# Patient Record
Sex: FEMALE | Race: WHITE | Employment: FULL TIME | URBAN - METROPOLITAN AREA
[De-identification: names, ages, dates, MRNs, and addresses within clinical notes are randomized per-mention and may not be internally consistent; named-entity substitution may affect disease eponyms.]

---

## 2017-02-28 ENCOUNTER — OFFICE VISIT (OUTPATIENT)
Dept: FAMILY MEDICINE | Facility: CLINIC | Age: 52
End: 2017-02-28
Payer: COMMERCIAL

## 2017-02-28 VITALS
HEIGHT: 68 IN | TEMPERATURE: 98.6 F | RESPIRATION RATE: 12 BRPM | OXYGEN SATURATION: 100 % | BODY MASS INDEX: 20.28 KG/M2 | DIASTOLIC BLOOD PRESSURE: 70 MMHG | HEART RATE: 94 BPM | SYSTOLIC BLOOD PRESSURE: 104 MMHG | WEIGHT: 133.8 LBS

## 2017-02-28 DIAGNOSIS — N95.1 MENOPAUSAL SYMPTOMS: ICD-10-CM

## 2017-02-28 DIAGNOSIS — Z13.1 SCREENING FOR DIABETES MELLITUS: ICD-10-CM

## 2017-02-28 DIAGNOSIS — F32.5 MAJOR DEPRESSION IN COMPLETE REMISSION (H): ICD-10-CM

## 2017-02-28 DIAGNOSIS — Z00.00 ENCOUNTER FOR ROUTINE ADULT HEALTH EXAMINATION WITHOUT ABNORMAL FINDINGS: Primary | ICD-10-CM

## 2017-02-28 DIAGNOSIS — Z23 NEED FOR PROPHYLACTIC VACCINATION WITH TETANUS-DIPHTHERIA (TD): ICD-10-CM

## 2017-02-28 DIAGNOSIS — Z11.59 NEED FOR HEPATITIS C SCREENING TEST: ICD-10-CM

## 2017-02-28 DIAGNOSIS — Z13.220 SCREENING FOR HYPERLIPIDEMIA: ICD-10-CM

## 2017-02-28 DIAGNOSIS — Z23 NEED FOR PROPHYLACTIC VACCINATION WITH COMBINED DIPHTHERIA-TETANUS-PERTUSSIS (DTP) VACCINE: ICD-10-CM

## 2017-02-28 LAB
CHOLEST SERPL-MCNC: 219 MG/DL
GLUCOSE SERPL-MCNC: 105 MG/DL (ref 70–99)
HDLC SERPL-MCNC: 87 MG/DL
LDLC SERPL CALC-MCNC: 113 MG/DL
NONHDLC SERPL-MCNC: 132 MG/DL
TRIGL SERPL-MCNC: 96 MG/DL

## 2017-02-28 PROCEDURE — 80061 LIPID PANEL: CPT | Performed by: PHYSICIAN ASSISTANT

## 2017-02-28 PROCEDURE — 90715 TDAP VACCINE 7 YRS/> IM: CPT | Performed by: PHYSICIAN ASSISTANT

## 2017-02-28 PROCEDURE — 82947 ASSAY GLUCOSE BLOOD QUANT: CPT | Performed by: PHYSICIAN ASSISTANT

## 2017-02-28 PROCEDURE — 99396 PREV VISIT EST AGE 40-64: CPT | Mod: 25 | Performed by: PHYSICIAN ASSISTANT

## 2017-02-28 PROCEDURE — 90471 IMMUNIZATION ADMIN: CPT | Performed by: PHYSICIAN ASSISTANT

## 2017-02-28 PROCEDURE — 36415 COLL VENOUS BLD VENIPUNCTURE: CPT | Performed by: PHYSICIAN ASSISTANT

## 2017-02-28 PROCEDURE — 86803 HEPATITIS C AB TEST: CPT | Performed by: PHYSICIAN ASSISTANT

## 2017-02-28 RX ORDER — OXYCODONE AND ACETAMINOPHEN 5; 325 MG/1; MG/1
TABLET ORAL
Refills: 0 | COMMUNITY
Start: 2017-02-11 | End: 2018-06-11

## 2017-02-28 RX ORDER — VENLAFAXINE HYDROCHLORIDE 150 MG/1
150 CAPSULE, EXTENDED RELEASE ORAL DAILY
Qty: 90 CAPSULE | Refills: 3 | Status: SHIPPED | OUTPATIENT
Start: 2017-02-28 | End: 2018-05-17

## 2017-02-28 ASSESSMENT — ANXIETY QUESTIONNAIRES
6. BECOMING EASILY ANNOYED OR IRRITABLE: NOT AT ALL
IF YOU CHECKED OFF ANY PROBLEMS ON THIS QUESTIONNAIRE, HOW DIFFICULT HAVE THESE PROBLEMS MADE IT FOR YOU TO DO YOUR WORK, TAKE CARE OF THINGS AT HOME, OR GET ALONG WITH OTHER PEOPLE: NOT DIFFICULT AT ALL
3. WORRYING TOO MUCH ABOUT DIFFERENT THINGS: NOT AT ALL
2. NOT BEING ABLE TO STOP OR CONTROL WORRYING: NOT AT ALL
7. FEELING AFRAID AS IF SOMETHING AWFUL MIGHT HAPPEN: NOT AT ALL
5. BEING SO RESTLESS THAT IT IS HARD TO SIT STILL: NOT AT ALL
1. FEELING NERVOUS, ANXIOUS, OR ON EDGE: NOT AT ALL
GAD7 TOTAL SCORE: 0

## 2017-02-28 ASSESSMENT — PATIENT HEALTH QUESTIONNAIRE - PHQ9: 5. POOR APPETITE OR OVEREATING: NOT AT ALL

## 2017-02-28 NOTE — PATIENT INSTRUCTIONS
Try filling prescription for effexor 150 mg daily  Contact us if this is not covered and will prescribe citalopram  We will notify you of lab results by Aiotragordon     Preventive Health Recommendations  Female Ages 50 - 64    Yearly exam: See your health care provider every year in order to  o Review health changes.   o Discuss preventive care.    o Review your medicines if your doctor has prescribed any.      Get a Pap test every three years (unless you have an abnormal result and your provider advises testing more often).    If you get Pap tests with HPV test, you only need to test every 5 years, unless you have an abnormal result.     You do not need a Pap test if your uterus was removed (hysterectomy) and you have not had cancer.    You should be tested each year for STDs (sexually transmitted diseases) if you're at risk.     Have a mammogram every 1 to 2 years.    Have a colonoscopy at age 50, or have a yearly FIT test (stool test). These exams screen for colon cancer.      Have a cholesterol test every 5 years, or more often if advised.    Have a diabetes test (fasting glucose) every three years. If you are at risk for diabetes, you should have this test more often.     If you are at risk for osteoporosis (brittle bone disease), think about having a bone density scan (DEXA).    Shots: Get a flu shot each year. Get a tetanus shot every 10 years.    Nutrition:     Eat at least 5 servings of fruits and vegetables each day.    Eat whole-grain bread, whole-wheat pasta and brown rice instead of white grains and rice.    Talk to your provider about Calcium and Vitamin D.     Lifestyle    Exercise at least 150 minutes a week (30 minutes a day, 5 days a week). This will help you control your weight and prevent disease.    Limit alcohol to one drink per day.    No smoking.     Wear sunscreen to prevent skin cancer.     See your dentist every six months for an exam and cleaning.    See your eye doctor every 1 to 2  years.

## 2017-02-28 NOTE — NURSING NOTE
Screening Questionnaire for Adult Immunization    Are you sick today?   No   Do you have allergies to medications, food, a vaccine component or latex?   No   Have you ever had a serious reaction after receiving a vaccination?   No   Do you have a long-term health problem with heart disease, lung disease, asthma, kidney disease, metabolic disease (e.g. diabetes), anemia, or other blood disorder?   No   Do you have cancer, leukemia, HIV/AIDS, or any other immune system problem?   No   In the past 3 months, have you taken medications that affect  your immune system, such as prednisone, other steroids, or anticancer drugs; drugs for the treatment of rheumatoid arthritis, Crohn s disease, or psoriasis; or have you had radiation treatments?   No   Have you had a seizure, or a brain or other nervous system problem?   No   During the past year, have you received a transfusion of blood or blood     products, or been given immune (gamma) globulin or antiviral drug?   No   For women: Are you pregnant or is there a chance you could become        pregnant during the next month?   No   Have you received any vaccinations in the past 4 weeks?   No     Immunization questionnaire answers were all negative.      MNVFC doesn't apply on this patient    Per orders of Hortensia Ricks, injection of TDAP given by Nuria Wynne. Patient instructed to remain in clinic for 20 minutes afterwards, and to report any adverse reaction to me immediately.       Screening performed by Nuria Wynne on 2/28/2017 at 11:37 AM.

## 2017-02-28 NOTE — NURSING NOTE
"Chief Complaint   Patient presents with     Physical     fasting       Initial /70  Pulse 94  Temp 98.6  F (37  C) (Oral)  Resp 12  Ht 1.715 m (5' 7.5\")  Wt 60.7 kg (133 lb 12.8 oz)  SpO2 100%  BMI 20.65 kg/m2 Estimated body mass index is 20.65 kg/(m^2) as calculated from the following:    Height as of this encounter: 1.715 m (5' 7.5\").    Weight as of this encounter: 60.7 kg (133 lb 12.8 oz).  Medication Reconciliation: narinder Wynne        "

## 2017-02-28 NOTE — PROGRESS NOTES
SUBJECTIVE:     CC: Palmira Hunt is an 51 year old woman who presents for preventive health visit.     Healthy Habits:    Do you get at least three servings of calcium containing foods daily (dairy, green leafy vegetables, etc.)? yes    Amount of exercise or daily activities, outside of work: 5 day(s) per week    Problems taking medications regularly No    Medication side effects: No    Have you had an eye exam in the past two years? yes    Do you see a dentist twice per year? yes    Do you have sleep apnea, excessive snoring or daytime drowsiness?no            Today's PHQ-2 Score:   PHQ-2 ( 1999 Pfizer) 2/28/2017 7/26/2016   Q1: Little interest or pleasure in doing things 0 0   Q2: Feeling down, depressed or hopeless 0 0   PHQ-2 Score 0 0       Abuse: Current or Past(Physical, Sexual or Emotional)- No  Do you feel safe in your environment - Yes    Social History   Substance Use Topics     Smoking status: Never Smoker     Smokeless tobacco: Never Used     Alcohol use Yes      Comment: occasional, 3 drinks a week     The patient does not drink >3 drinks per day nor >7 drinks per week.    Recent Labs   Lab Test  11/13/15   0912  04/10/14   0839   CHOL  214*  208*   HDL  106  106   LDL  90  89   TRIG  89  65   CHOLHDLRATIO  2.0  2.0       Reviewed orders with patient.  Reviewed health maintenance and updated orders accordingly - Yes    Mammo Decision Support:  Patient over age 50, mutual decision to screen reflected in health maintenance.    Pertinent mammograms are reviewed under the imaging tab.  History of abnormal Pap smear: NO - age 30- 65 PAP every 3 years recommended    Reviewed and updated as needed this visit by clinical staff  Tobacco  Allergies  Meds  Med Hx  Surg Hx  Fam Hx  Soc Hx        Reviewed and updated as needed this visit by Provider        Not depressed.  Not taking effexor for 2 months.   No hot flashes.  Mood ok.     2 oral surgeries with infection after root canal.  On iv  "antibiotics another 2 weeks.  9 day stay in hospital    ROS:  C: NEGATIVE for fever, chills, change in weight  I: NEGATIVE for worrisome rashes, moles or lesions  E: NEGATIVE for vision changes or irritation  ENT: NEGATIVE for ear, mouth and throat problems  R: NEGATIVE for significant cough or SOB  B: NEGATIVE for masses, tenderness or discharge  CV: NEGATIVE for chest pain, palpitations or peripheral edema  GI: NEGATIVE for nausea, abdominal pain, heartburn, or change in bowel habits  : NEGATIVE for unusual urinary or vaginal symptoms. No vaginal bleeding.  M: NEGATIVE for significant arthralgias or myalgia  N: NEGATIVE for weakness, dizziness or paresthesias  P: NEGATIVE for changes in mood or affect     Problem list, Medication list, Allergies, and Medical/Social/Surgical histories reviewed in EPIC and updated as appropriate.  OBJECTIVE:     /70  Pulse 94  Temp 98.6  F (37  C) (Oral)  Resp 12  Ht 1.715 m (5' 7.5\")  Wt 60.7 kg (133 lb 12.8 oz)  SpO2 100%  BMI 20.65 kg/m2  EXAM:  GENERAL: healthy, alert and no distress  EYES: Eyes grossly normal to inspection, PERRL and conjunctivae and sclerae normal  HENT: ear canals and TM's normal, nose and mouth without ulcers or lesions  NECK: no adenopathy, no asymmetry, masses, or scars and thyroid normal to palpation  RESP: lungs clear to auscultation - no rales, rhonchi or wheezes  BREAST: normal without masses, tenderness or nipple discharge and no palpable axillary masses or adenopathy  CV: regular rate and rhythm, normal S1 S2, no S3 or S4, no murmur, click or rub, no peripheral edema and peripheral pulses strong  ABDOMEN: soft, nontender, no hepatosplenomegaly, no masses and bowel sounds normal   (female): normal female external genitalia, normal urethral meatus , vaginal mucosal atrophy and normal cervix, adnexae, and uterus without masses.  MS: no gross musculoskeletal defects noted, no edema  SKIN: no suspicious lesions or rashes  NEURO: Normal strength " "and tone, mentation intact and speech normal  PSYCH: mentation appears normal, affect normal/bright    ASSESSMENT/PLAN:     1. Encounter for routine adult health examination without abnormal findings      2. Need for hepatitis C screening test    - Hepatitis C Screen Reflex to HCV RNA Quant and Genotype    3. Need for prophylactic vaccination with tetanus-diphtheria (TD)  Given tdap    4. Screening for hyperlipidemia    - Lipid panel reflex to direct LDL    5. Screening for diabetes mellitus    - Glucose    6. Menopausal symptoms  Well managed without med.     7. Major depression in complete remission (H)    - venlafaxine (EFFEXOR-XR) 150 MG 24 hr capsule; Take 1 capsule (150 mg) by mouth daily  Dispense: 90 capsule; Refill: 3    8. Need for prophylactic vaccination with combined diphtheria-tetanus-pertussis (DTP) vaccine    - TDAP (ADACEL AGES 11-64)    COUNSELING:   Reviewed preventive health counseling, as reflected in patient instructions       Regular exercise       Healthy diet/nutrition       Vision screening       Osteoporosis Prevention/Bone Health         reports that she has never smoked. She has never used smokeless tobacco.    Estimated body mass index is 20.65 kg/(m^2) as calculated from the following:    Height as of this encounter: 1.715 m (5' 7.5\").    Weight as of this encounter: 60.7 kg (133 lb 12.8 oz).       Counseling Resources:  ATP IV Guidelines  Pooled Cohorts Equation Calculator  Breast Cancer Risk Calculator  FRAX Risk Assessment  ICSI Preventive Guidelines  Dietary Guidelines for Americans, 2010  USDA's MyPlate  ASA Prophylaxis  Lung CA Screening    Hortensia Ricks PA-C  [facility]  "

## 2017-03-01 LAB — HCV AB SERPL QL IA: NORMAL

## 2017-03-01 ASSESSMENT — ANXIETY QUESTIONNAIRES: GAD7 TOTAL SCORE: 0

## 2017-03-01 ASSESSMENT — PATIENT HEALTH QUESTIONNAIRE - PHQ9: SUM OF ALL RESPONSES TO PHQ QUESTIONS 1-9: 0

## 2017-03-01 NOTE — PROGRESS NOTES
"Raheem Palmira  Your blood sugar is borderline elevated.    This is in the \"prediabetes\" range.    Exercise and limiting carbohydrates and sugars in the diet can be helpful to avoid progression to diabetes.  At minimum we need to check your blood sugar annually.    Your cholesterol was just a little high but not high enough to warrant medications.   Your hepatitis C test was negative.   Please call or MyChart my office with any questions or concerns.    Hortensia Ricks, PAC        "

## 2017-06-02 ENCOUNTER — TELEPHONE (OUTPATIENT)
Dept: FAMILY MEDICINE | Facility: CLINIC | Age: 52
End: 2017-06-02

## 2017-06-02 ENCOUNTER — MYC MEDICAL ADVICE (OUTPATIENT)
Dept: FAMILY MEDICINE | Facility: CLINIC | Age: 52
End: 2017-06-02

## 2017-06-02 DIAGNOSIS — T75.3XXA MOTION SICKNESS, INITIAL ENCOUNTER: ICD-10-CM

## 2017-06-02 RX ORDER — SCOLOPAMINE TRANSDERMAL SYSTEM 1 MG/1
PATCH, EXTENDED RELEASE TRANSDERMAL
Qty: 7 PATCH | Refills: 0 | Status: SHIPPED | OUTPATIENT
Start: 2017-06-02 | End: 2018-06-11

## 2017-06-02 NOTE — TELEPHONE ENCOUNTER
Reason for Call:  Medication or medication refill:    Do you use a [facility]?  Name of the pharmacy and phone number for the current request:  [facility] [address] [place] [address] [address]    Name of the medication requested: asking for medication to take on cruise this Sunday the 4th. Please call when taken care of    Other request:     Can we leave a detailed message on this number? YES    Phone number patient can be reached at: Cell number on file:    Telephone Information:   Mobile [phone]       Best Time: any    Call taken on 6/2/2017 at 2:23 PM by Berenice Quintana

## 2017-06-02 NOTE — TELEPHONE ENCOUNTER
Reason for Call:  Medication or medication refill:    Do you use a [facility]?  Name of the pharmacy and phone number for the current request:  [facility]    Name of the medication requested: patches for sea sickness needs today plz call when approved    Other request: please call when approved    Can we leave a detailed message on this number? YES    Phone number patient can be reached at: Cell number on file:    Telephone Information:   Mobile [phone]       Best Time: any    Call taken on 6/2/2017 at 9:00 AM by Anila Anne

## 2018-05-17 ENCOUNTER — TELEPHONE (OUTPATIENT)
Dept: FAMILY MEDICINE | Facility: CLINIC | Age: 53
End: 2018-05-17

## 2018-05-17 DIAGNOSIS — F32.5 MAJOR DEPRESSION IN COMPLETE REMISSION (H): ICD-10-CM

## 2018-05-17 RX ORDER — VENLAFAXINE HYDROCHLORIDE 150 MG/1
150 CAPSULE, EXTENDED RELEASE ORAL DAILY
Qty: 30 CAPSULE | Refills: 0 | Status: SHIPPED | OUTPATIENT
Start: 2018-05-17 | End: 2018-06-11

## 2018-05-17 NOTE — TELEPHONE ENCOUNTER
Medication is being filled for 1 time refill only due to:  Patient needs labs serum creatinine, BP check, and PHQ-9. Patient needs to be seen because it has been more than one year since last visit.     Team, please inform patient a 30 day geena refill has been sent but she needs to be seen before any further refills.     Teresa Cortes RN

## 2018-05-17 NOTE — TELEPHONE ENCOUNTER
"Requested Prescriptions   Pending Prescriptions Disp Refills     venlafaxine (EFFEXOR-XR) 150 MG 24 hr capsule 90 capsule 3     Sig: Take 1 capsule (150 mg) by mouth daily    Serotonin-Norepinephrine Reuptake Inhibitors  Failed    5/17/2018  8:22 AM       Failed - Blood pressure under 140/90 in past 12 months    BP Readings from Last 3 Encounters:   02/28/17 104/70   12/27/16 120/88   07/26/16 126/88                Failed - PHQ-9 score of less than 5 in past 6 months    Please review last PHQ-9 score.          Failed - Normal serum creatinine on file in past 12 months    Recent Labs   Lab Test  06/15/11   1137   CR  0.78            Failed - Recent (6 mo) or future (30 days) visit within the authorizing provider's specialty    Patient had office visit in the last 6 months or has a visit in the next 30 days with authorizing provider or within the authorizing provider's specialty.  See \"Patient Info\" tab in inbasket, or \"Choose Columns\" in Meds & Orders section of the refill encounter.           Passed - Patient is age 18 or older       Passed - No active pregnancy on record       Passed - No positive pregnancy test in past 12 months        venlafaxine (EFFEXOR-XR) 150 MG 24 hr capsule  Last Written Prescription Date:  2/28/17  Last Fill Quantity: 90,  # refills: 3   Last office visit: 2/28/2017 with prescribing provider:  Hortensia Ricks   Future Office Visit:      "

## 2018-06-01 ENCOUNTER — DOCUMENTATION ONLY (OUTPATIENT)
Dept: LAB | Facility: CLINIC | Age: 53
End: 2018-06-01

## 2018-06-01 DIAGNOSIS — E78.2 MIXED HYPERLIPIDEMIA: Primary | ICD-10-CM

## 2018-06-01 DIAGNOSIS — Z13.1 SCREENING FOR DIABETES MELLITUS: ICD-10-CM

## 2018-06-01 NOTE — PROGRESS NOTES
Please place or confirm lab orders for upcoming lab appointment on 06/08/2018, Physician appointment on 06/11/2018. DALE Dumas CMA.

## 2018-06-06 DIAGNOSIS — E78.2 MIXED HYPERLIPIDEMIA: ICD-10-CM

## 2018-06-06 DIAGNOSIS — Z13.1 SCREENING FOR DIABETES MELLITUS: ICD-10-CM

## 2018-06-06 LAB
CHOLEST SERPL-MCNC: 280 MG/DL
GLUCOSE SERPL-MCNC: 86 MG/DL (ref 70–99)
HDLC SERPL-MCNC: 134 MG/DL
LDLC SERPL CALC-MCNC: 137 MG/DL
NONHDLC SERPL-MCNC: 146 MG/DL
TRIGL SERPL-MCNC: 44 MG/DL

## 2018-06-06 PROCEDURE — 80061 LIPID PANEL: CPT | Performed by: NURSE PRACTITIONER

## 2018-06-06 PROCEDURE — 82947 ASSAY GLUCOSE BLOOD QUANT: CPT | Performed by: NURSE PRACTITIONER

## 2018-06-06 PROCEDURE — 36415 COLL VENOUS BLD VENIPUNCTURE: CPT | Performed by: NURSE PRACTITIONER

## 2018-06-11 ENCOUNTER — OFFICE VISIT (OUTPATIENT)
Dept: FAMILY MEDICINE | Facility: CLINIC | Age: 53
End: 2018-06-11
Payer: COMMERCIAL

## 2018-06-11 ENCOUNTER — RESULT FOLLOW UP (OUTPATIENT)
Dept: FAMILY MEDICINE | Facility: CLINIC | Age: 53
End: 2018-06-11

## 2018-06-11 VITALS
BODY MASS INDEX: 20.57 KG/M2 | DIASTOLIC BLOOD PRESSURE: 82 MMHG | WEIGHT: 135.7 LBS | HEIGHT: 68 IN | OXYGEN SATURATION: 98 % | SYSTOLIC BLOOD PRESSURE: 102 MMHG | HEART RATE: 86 BPM | TEMPERATURE: 98.5 F | RESPIRATION RATE: 18 BRPM

## 2018-06-11 DIAGNOSIS — R87.612 PAPANICOLAOU SMEAR OF CERVIX WITH LOW GRADE SQUAMOUS INTRAEPITHELIAL LESION (LGSIL): ICD-10-CM

## 2018-06-11 DIAGNOSIS — Z00.00 ENCOUNTER FOR ROUTINE ADULT HEALTH EXAMINATION WITHOUT ABNORMAL FINDINGS: Primary | ICD-10-CM

## 2018-06-11 DIAGNOSIS — Z12.4 SCREENING FOR MALIGNANT NEOPLASM OF CERVIX: ICD-10-CM

## 2018-06-11 DIAGNOSIS — F32.5 MAJOR DEPRESSION IN COMPLETE REMISSION (H): ICD-10-CM

## 2018-06-11 DIAGNOSIS — Z12.31 VISIT FOR SCREENING MAMMOGRAM: ICD-10-CM

## 2018-06-11 PROCEDURE — 87624 HPV HI-RISK TYP POOLED RSLT: CPT | Performed by: NURSE PRACTITIONER

## 2018-06-11 PROCEDURE — G0145 SCR C/V CYTO,THINLAYER,RESCR: HCPCS | Performed by: NURSE PRACTITIONER

## 2018-06-11 PROCEDURE — 99396 PREV VISIT EST AGE 40-64: CPT | Performed by: NURSE PRACTITIONER

## 2018-06-11 RX ORDER — VENLAFAXINE HYDROCHLORIDE 150 MG/1
150 CAPSULE, EXTENDED RELEASE ORAL DAILY
Qty: 90 CAPSULE | Refills: 3 | Status: SHIPPED | OUTPATIENT
Start: 2018-06-11 | End: 2019-06-20

## 2018-06-11 ASSESSMENT — ANXIETY QUESTIONNAIRES
5. BEING SO RESTLESS THAT IT IS HARD TO SIT STILL: NOT AT ALL
GAD7 TOTAL SCORE: 0
1. FEELING NERVOUS, ANXIOUS, OR ON EDGE: NOT AT ALL
6. BECOMING EASILY ANNOYED OR IRRITABLE: NOT AT ALL
2. NOT BEING ABLE TO STOP OR CONTROL WORRYING: NOT AT ALL
7. FEELING AFRAID AS IF SOMETHING AWFUL MIGHT HAPPEN: NOT AT ALL
IF YOU CHECKED OFF ANY PROBLEMS ON THIS QUESTIONNAIRE, HOW DIFFICULT HAVE THESE PROBLEMS MADE IT FOR YOU TO DO YOUR WORK, TAKE CARE OF THINGS AT HOME, OR GET ALONG WITH OTHER PEOPLE: NOT DIFFICULT AT ALL
3. WORRYING TOO MUCH ABOUT DIFFERENT THINGS: NOT AT ALL

## 2018-06-11 ASSESSMENT — PATIENT HEALTH QUESTIONNAIRE - PHQ9: 5. POOR APPETITE OR OVEREATING: NOT AT ALL

## 2018-06-11 ASSESSMENT — PAIN SCALES - GENERAL: PAINLEVEL: NO PAIN (0)

## 2018-06-11 NOTE — LETTER
My Depression Action Plan  Name: Palmira Hunt   Date of Birth 1965  Date: 6/11/2018    My doctor: [facility], [facility]   My clinic: [address] [address] [phone]          GREEN    ZONE   Good Control    What it looks like:     Things are going generally well. You have normal up s and down s. You may even feel depressed from time to time, but bad moods usually last less than a day.   What you need to do:  1. Continue to care for yourself (see self care plan)  2. Check your depression survival kit and update it as needed  3. Follow your physician s recommendations including any medication.  4. Do not stop taking medication unless you consult with your physician first.           YELLOW         ZONE Getting Worse    What it looks like:     Depression is starting to interfere with your life.     It may be hard to get out of bed; you may be starting to isolate yourself from others.    Symptoms of depression are starting to last most all day and this has happened for several days.     You may have suicidal thoughts but they are not constant.   What you need to do:     1. Call your care team, your response to treatment will improve if you keep your care team informed of your progress. Yellow periods are signs an adjustment may need to be made.     2. Continue your self-care, even if you have to fake it!    3. Talk to someone in your support network    4. Open up your depression survival kit           RED    ZONE Medical Alert - Get Help    What it looks like:     Depression is seriously interfering with your life.     You may experience these or other symptoms: You can t get out of bed most days, can t work or engage in other necessary activities, you have trouble taking care of basic hygiene, or basic responsibilities, thoughts of suicide or death that will not go away, self-injurious behavior.     What you need to  do:  1. Call your care team and request a same-day appointment. If they are not available (weekends or after hours) call your local crisis line, emergency room or 911.            Depression Self Care Plan / Survival Kit    Self-Care for Depression  Here s the deal. Your body and mind are really not as separate as most people think.  What you do and think affects how you feel and how you feel influences what you do and think. This means if you do things that people who feel good do, it will help you feel better.  Sometimes this is all it takes.  There is also a place for medication and therapy depending on how severe your depression is, so be sure to consult with your medical provider and/ or Behavioral Health Consultant if your symptoms are worsening or not improving.     In order to better manage my stress, I will:    Exercise  Get some form of exercise, every day. This will help reduce pain and release endorphins, the  feel good  chemicals in your brain. This is almost as good as taking antidepressants!  This is not the same as joining a gym and then never going! (they count on that by the way ) It can be as simple as just going for a walk or doing some gardening, anything that will get you moving.      Hygiene   Maintain good hygiene (Get out of bed in the morning, Make your bed, Brush your teeth, Take a shower, and Get dressed like you were going to work, even if you are unemployed).  If your clothes don't fit try to get ones that do.    Diet  I will strive to eat foods that are good for me, drink plenty of water, and avoid excessive sugar, caffeine, alcohol, and other mood-altering substances.  Some foods that are helpful in depression are: complex carbohydrates, B vitamins, flaxseed, fish or fish oil, fresh fruits and vegetables.    Psychotherapy  I agree to participate in Individual Therapy (if recommended).    Medication  If prescribed medications, I agree to take them.  Missing doses can result in serious  side effects.  I understand that drinking alcohol, or other illicit drug use, may cause potential side effects.  I will not stop my medication abruptly without first discussing it with my provider.    Staying Connected With Others  I will stay in touch with my friends, family members, and my primary care provider/team.    Use your imagination  Be creative.  We all have a creative side; it doesn t matter if it s oil painting, sand castles, or mud pies! This will also kick up the endorphins.    Witness Beauty  (AKA stop and smell the roses) Take a look outside, even in mid-winter. Notice colors, textures. Watch the squirrels and birds.     Service to others  Be of service to others.  There is always someone else in need.  By helping others we can  get out of ourselves  and remember the really important things.  This also provides opportunities for practicing all the other parts of the program.    Humor  Laugh and be silly!  Adjust your TV habits for less news and crime-drama and more comedy.    Control your stress  Try breathing deep, massage therapy, biofeedback, and meditation. Find time to relax each day.     My support system    Clinic Contact:  Phone number:    Contact 1:  Phone number:    Contact 2:  Phone number:    Jain/:  Phone number:    Therapist:  Phone number:    Local crisis center:    Phone number:    Other community support:  Phone number:

## 2018-06-11 NOTE — LETTER
May 28, 2019      Palmira Montes Marilee  [address]  [facility] [address]    Dear ,      At [facility], your health and wellness is our primary concern. That is why we are following up on a positive high risk HPV test from 6/11/18. Your provider had recommended that you have a Pap smear and HPV test completed by 6/11/19. Our records do not show that this has been scheduled.    It is important to complete the follow up that your provider has suggested for you to ensure that there are no worsening changes which may, over time, develop into cancer.      Please contact our office at  [phone] to schedule an appointment for a Pap smear and HPV test at your earliest convenience. If you have questions or concerns, please call the clinic and we will be happy to assist you.    If you have completed the tests outside of [facility], please have the results forwarded to our office. We will update the chart for your primary Physician to review before your next annual physical.     Thank you for choosing [facility]!    Sincerely,      Your [facility] Care Team/arlene

## 2018-06-11 NOTE — LETTER
June 4, 2019      Palmira Montes Marilee  [address]  [facility] [address]    Dear ,      At [facility], your health and wellness is our primary concern. That is why we are following up on a positive high risk HPV test from 6/11/18. Your provider had recommended that you have a Pap smear and HPV test completed by 6/11/19. Our records do not show that this has been scheduled.    It is important to complete the follow up that your provider has suggested for you to ensure that there are no worsening changes which may, over time, develop into cancer.      Please contact our office at  [phone] to schedule an appointment for a Pap smear and HPV test at your earliest convenience. If you have questions or concerns, please call the clinic and we will be happy to assist you.    If you have completed the tests outside of [facility], please have the results forwarded to our office. We will update the chart for your primary Physician to review before your next annual physical.     Thank you for choosing [facility]!    Sincerely,      Your [facility] Care Team/arlene

## 2018-06-11 NOTE — MR AVS SNAPSHOT
After Visit Summary   6/11/2018    Palmira Hunt    MRN: [id number]           Patient Information     Date Of Birth          1965        Visit Information        Provider Department      6/11/2018 8:00 AM Suzanne Dallas NP [facility]        Today's Diagnoses     Visit for screening mammogram        Screening for malignant neoplasm of cervix        Screening for HIV (human immunodeficiency virus)        Major depression in complete remission (H)          Care Instructions      Preventive Health Recommendations  Female Ages 50 - 64    Yearly exam: See your health care provider every year in order to  o Review health changes.   o Discuss preventive care.    o Review your medicines if your doctor has prescribed any.      Get a Pap test every three years (unless you have an abnormal result and your provider advises testing more often).    If you get Pap tests with HPV test, you only need to test every 5 years, unless you have an abnormal result.     You do not need a Pap test if your uterus was removed (hysterectomy) and you have not had cancer.    You should be tested each year for STDs (sexually transmitted diseases) if you're at risk.     Have a mammogram every 1 to 2 years.    Have a colonoscopy at age 50, or have a yearly FIT test (stool test). These exams screen for colon cancer.      Have a cholesterol test every 5 years, or more often if advised.    Have a diabetes test (fasting glucose) every three years. If you are at risk for diabetes, you should have this test more often.     If you are at risk for osteoporosis (brittle bone disease), think about having a bone density scan (DEXA).    Shots: Get a flu shot each year. Get a tetanus shot every 10 years.    Nutrition:     Eat at least 5 servings of fruits and vegetables each day.    Eat whole-grain bread, whole-wheat pasta and brown rice instead of white grains and rice.    Talk to your provider about Calcium and Vitamin  D.     Lifestyle    Exercise at least 150 minutes a week (30 minutes a day, 5 days a week). This will help you control your weight and prevent disease.    Limit alcohol to one drink per day.    No smoking.     Wear sunscreen to prevent skin cancer.     See your dentist every six months for an exam and cleaning.    See your eye doctor every 1 to 2 years.            Follow-ups after your visit        Future tests that were ordered for you today     Open Future Orders        Priority Expected Expires Ordered    MA SCREENING DIGITAL BILAT - Future  (s+30) Routine  6/11/2019 6/11/2018            Who to contact     If you have questions or need follow up information about today's clinic visit or your schedule please contact [facility] directly at [phone].  Normal or non-critical lab and imaging results will be communicated to you by Atoshohart, letter or phone within 4 business days after the clinic has received the results. If you do not hear from us within 7 days, please contact the clinic through [employer] or phone. If you have a critical or abnormal lab result, we will notify you by phone as soon as possible.  Submit refill requests through [employer] or call your pharmacy and they will forward the refill request to us. Please allow 3 business days for your refill to be completed.          Additional Information About Your Visit        [employer] Information     [employer] gives you secure access to your electronic health record. If you see a primary care provider, you can also send messages to your care team and make appointments. If you have questions, please call your primary care clinic.  If you do not have a primary care provider, please call [phone] and they will assist you.        Care EveryWhere ID     This is your Care EveryWhere ID. This could be used by other organizations to access your [place] medical records  [id number]        Your Vitals Were     Pulse Temperature Respirations Height  "Pulse Oximetry BMI (Body Mass Index)    86 98.5  F (36.9  C) (Oral) 18 1.715 m (5' 7.5\") 98% 20.94 kg/m2       Blood Pressure from Last 3 Encounters:   06/11/18 102/82   02/28/17 104/70   12/27/16 120/88    Weight from Last 3 Encounters:   06/11/18 61.6 kg (135 lb 11.2 oz)   02/28/17 60.7 kg (133 lb 12.8 oz)   12/27/16 62.6 kg (138 lb)              We Performed the Following     HPV High Risk Types DNA Cervical     Pap imaged thin layer screen with HPV - recommended age 30 - 65 years (select HPV order below)          Where to get your medicines      These medications were sent to [place] [address] [place] [address] [facility] [address]     Phone:  [phone]     venlafaxine 150 MG 24 hr capsule          Primary Care Provider Office Phone # Fax #    [facility] [phone] [address] [facility] [address]        Equal Access to Services     KELLEY OLIVARES : Hadii aad ku hadasho Soomaali, waaxda luqadaha, qaybta kaalmada adeegyada, bean duran. So [facility] [phone].    ATENCIÓN: Si habla español, tiene a corbett disposición servicios gratuitos de asistencia lingüística. Erichame al [phone].    We comply with applicable federal civil rights laws and [place] laws. We do not discriminate on the basis of race, color, national origin, age, disability, sex, sexual orientation, or gender identity.            Thank you!     Thank you for choosing [facility]  for your care. Our goal is always to provide you with excellent care. Hearing back from our patients is one way we can continue to improve our services. Please take a few minutes to complete the written survey that you may receive in the mail after your visit with us. Thank you!             Your Updated Medication List - Protect others around you: Learn how to safely use, store and throw away your medicines at www.disposemymeds.org.        "   This list is accurate as of 6/11/18  8:44 AM.  Always use your most recent med list.                   Brand Name Dispense Instructions for use Diagnosis    venlafaxine 150 MG 24 hr capsule    EFFEXOR-XR    90 capsule    Take 1 capsule (150 mg) by mouth daily    Major depression in complete remission (H)

## 2018-06-11 NOTE — PROGRESS NOTES
SUBJECTIVE:   CC: Palmira Hunt is an 52 year old woman who presents for preventive health visit.     Healthy Habits:    Do you get at least three servings of calcium containing foods daily (dairy, green leafy vegetables, etc.)? yes    Amount of exercise or daily activities, outside of work: 5 day(s) per week    Problems taking medications regularly No    Medication side effects: No    Have you had an eye exam in the past two years? yes    Do you see a dentist twice per year? yes    Do you have sleep apnea, excessive snoring or daytime drowsiness?no    Right hip/buttock hurts. Pain is improved, more achy, still there. Hurt playing pickleball. Discussed if still painful in a month or so return for further evaluation.       Today's PHQ-2 Score:   PHQ-2 ( 1999 Pfizer) 6/11/2018 2/28/2017   Q1: Little interest or pleasure in doing things 0 0   Q2: Feeling down, depressed or hopeless 0 0   PHQ-2 Score 0 0       Abuse: Current or Past(Physical, Sexual or Emotional)- No  Do you feel safe in your environment - Yes    Social History   Substance Use Topics     Smoking status: Never Smoker     Smokeless tobacco: Never Used     Alcohol use Yes      Comment: occasional, 3 drinks a week     If you drink alcohol do you typically have >3 drinks per day or >7 drinks per week? No                     Reviewed orders with patient.  Reviewed health maintenance and updated orders accordingly - Yes  Labs reviewed in EPIC    Mammogram not appropriate for this patient based on age.    Pertinent mammograms are reviewed under the imaging tab.  History of abnormal Pap smear: Status post hysterectomy. Pap still indicated. Had +HPV in vaginal walls also. Does not have cervix.     Reviewed and updated as needed this visit by clinical staff  Tobacco  Allergies  Meds  Problems  Med Hx  Surg Hx  Fam Hx  Soc Hx          Reviewed and updated as needed this visit by Provider  Allergies  Meds  Problems  Surg Hx  Fam Hx       "      ROS:  CONSTITUTIONAL: NEGATIVE for fever, chills, change in weight  INTEGUMENTARY/SKIN: NEGATIVE for worrisome rashes, moles or lesions  EYES: NEGATIVE for vision changes or irritation  ENT: NEGATIVE for ear, mouth and throat problems  RESP: NEGATIVE for significant cough or SOB  BREAST: NEGATIVE for masses, tenderness or discharge  CV: NEGATIVE for chest pain, palpitations or peripheral edema  GI: NEGATIVE for nausea, abdominal pain, heartburn, or change in bowel habits  : NEGATIVE for unusual urinary or vaginal symptoms. No vaginal bleeding.  MUSCULOSKELETAL: NEGATIVE for significant arthralgias or myalgia  NEURO: NEGATIVE for weakness, dizziness or paresthesias  PSYCHIATRIC: NEGATIVE for changes in mood or affect     OBJECTIVE:   /82 (BP Location: Right arm, Patient Position: Sitting, Cuff Size: Adult Regular)  Pulse 86  Temp 98.5  F (36.9  C) (Oral)  Resp 18  Ht 1.715 m (5' 7.5\")  Wt 61.6 kg (135 lb 11.2 oz)  SpO2 98%  BMI 20.94 kg/m2  EXAM:  GENERAL: healthy, alert and no distress  EYES: Eyes grossly normal to inspection, PERRL and conjunctivae and sclerae normal  HENT: ear canals and TM's normal, nose and mouth without ulcers or lesions  NECK: no adenopathy, no asymmetry, masses, or scars and thyroid normal to palpation  RESP: lungs clear to auscultation - no rales, rhonchi or wheezes  BREAST: normal without masses, tenderness or nipple discharge and no palpable axillary masses or adenopathy  CV: regular rate and rhythm, normal S1 S2, no S3 or S4, no murmur, click or rub, no peripheral edema and peripheral pulses strong  ABDOMEN: soft, nontender, no hepatosplenomegaly, no masses and bowel sounds normal   (female): normal female external genitalia, normal urethral meatus, vaginal mucosa pink, dry to moist, no rugated, no cervix. No masses or discharge  MS: no gross musculoskeletal defects noted, no edema  SKIN: no suspicious lesions or rashes  NEURO: Normal strength and tone, mentation " "intact and speech normal  PSYCH: mentation appears normal, affect normal/bright    ASSESSMENT/PLAN:   1. Encounter for routine adult health examination without abnormal findings  Normal exam    2. Major depression in complete remission (H)  Refilled. Stable. Return 1 year  - venlafaxine (EFFEXOR-XR) 150 MG 24 hr capsule; Take 1 capsule (150 mg) by mouth daily  Dispense: 90 capsule; Refill: 3    3. Visit for screening mammogram  screen  - MA SCREENING DIGITAL BILAT - Future  (s+30); Future    4. Screening for malignant neoplasm of cervix  No cervix, screening vaginal walls for HPV. Has had 3-4 normals. Could go 3 years now  - Pap imaged thin layer screen with HPV - recommended age 30 - 65 years (select HPV order below)  - HPV High Risk Types DNA Cervical    COUNSELING:   Reviewed preventive health counseling, as reflected in patient instructions         reports that she has never smoked. She has never used smokeless tobacco.    Estimated body mass index is 20.94 kg/(m^2) as calculated from the following:    Height as of this encounter: 1.715 m (5' 7.5\").    Weight as of this encounter: 61.6 kg (135 lb 11.2 oz).       Counseling Resources:  ATP IV Guidelines  Pooled Cohorts Equation Calculator  Breast Cancer Risk Calculator  FRAX Risk Assessment  ICSI Preventive Guidelines  Dietary Guidelines for Americans, 2010  USDA's MyPlate  ASA Prophylaxis  Lung CA Screening    Follow up with provider 1 year or prn    CINDY Becker, NP-C  [facility]  "

## 2018-06-12 ASSESSMENT — ANXIETY QUESTIONNAIRES: GAD7 TOTAL SCORE: 0

## 2018-06-12 ASSESSMENT — PATIENT HEALTH QUESTIONNAIRE - PHQ9: SUM OF ALL RESPONSES TO PHQ QUESTIONS 1-9: 0

## 2018-06-14 LAB
COPATH REPORT: NORMAL
PAP: NORMAL

## 2018-06-18 LAB
FINAL DIAGNOSIS: ABNORMAL
HPV HR 12 DNA CVX QL NAA+PROBE: POSITIVE
HPV16 DNA SPEC QL NAA+PROBE: NEGATIVE
HPV18 DNA SPEC QL NAA+PROBE: NEGATIVE
SPECIMEN DESCRIPTION: ABNORMAL
SPECIMEN SOURCE CVX/VAG CYTO: ABNORMAL

## 2018-06-19 NOTE — PROGRESS NOTES
2002- history of TVH for cervical dysplasia.  5/6/09 pap LSIL  2/25/10 colpo/repeat pap- NIL. Plan-- repeat pap smear in 6 months at time of annual exam  7/15/10 pap NIL/neg HPV.   10/20/11 no pap per Dr Duran.  Annual exam 1 year.  6/18/13 NIL pap, neg HR HPV. Plan; pap in 3 years  11/15/13 NIL pap. Plan: pap in 3 years.  6/11/18 NIL pap, + HR HPV (not 16 or 18) Plan: cotest in 1 yr. Msg sent through My Chart results.  5/28/19 My Chart cotest reminder message sent (rlm)  6/4/19 My Chart not read, reminder letter sent (rlm)  6/26/19 Reminder call to pt, pt will call to schedule (rlm)  7/1/19 Patient is lost to follow-up. Routed to provider as GEORGINA. (rlm)

## 2018-11-27 ENCOUNTER — OFFICE VISIT (OUTPATIENT)
Dept: FAMILY MEDICINE | Facility: CLINIC | Age: 53
End: 2018-11-27
Payer: COMMERCIAL

## 2018-11-27 VITALS
BODY MASS INDEX: 21.48 KG/M2 | HEART RATE: 91 BPM | DIASTOLIC BLOOD PRESSURE: 98 MMHG | OXYGEN SATURATION: 99 % | SYSTOLIC BLOOD PRESSURE: 135 MMHG | TEMPERATURE: 98.7 F | RESPIRATION RATE: 18 BRPM | WEIGHT: 141.74 LBS | HEIGHT: 68 IN

## 2018-11-27 DIAGNOSIS — J01.00 ACUTE NON-RECURRENT MAXILLARY SINUSITIS: Primary | ICD-10-CM

## 2018-11-27 PROCEDURE — 99213 OFFICE O/P EST LOW 20 MIN: CPT | Performed by: NURSE PRACTITIONER

## 2018-11-27 RX ORDER — AZITHROMYCIN 250 MG/1
TABLET, FILM COATED ORAL
Qty: 6 TABLET | Refills: 0 | Status: SHIPPED | OUTPATIENT
Start: 2018-11-27 | End: 2018-12-14

## 2018-11-27 ASSESSMENT — ANXIETY QUESTIONNAIRES
3. WORRYING TOO MUCH ABOUT DIFFERENT THINGS: NOT AT ALL
GAD7 TOTAL SCORE: 0
5. BEING SO RESTLESS THAT IT IS HARD TO SIT STILL: NOT AT ALL
IF YOU CHECKED OFF ANY PROBLEMS ON THIS QUESTIONNAIRE, HOW DIFFICULT HAVE THESE PROBLEMS MADE IT FOR YOU TO DO YOUR WORK, TAKE CARE OF THINGS AT HOME, OR GET ALONG WITH OTHER PEOPLE: NOT DIFFICULT AT ALL
2. NOT BEING ABLE TO STOP OR CONTROL WORRYING: NOT AT ALL
1. FEELING NERVOUS, ANXIOUS, OR ON EDGE: NOT AT ALL
6. BECOMING EASILY ANNOYED OR IRRITABLE: NOT AT ALL
7. FEELING AFRAID AS IF SOMETHING AWFUL MIGHT HAPPEN: NOT AT ALL

## 2018-11-27 ASSESSMENT — PATIENT HEALTH QUESTIONNAIRE - PHQ9
SUM OF ALL RESPONSES TO PHQ QUESTIONS 1-9: 0
5. POOR APPETITE OR OVEREATING: NOT AT ALL

## 2018-11-27 ASSESSMENT — PAIN SCALES - GENERAL: PAINLEVEL: NO PAIN (0)

## 2018-11-27 NOTE — MR AVS SNAPSHOT
"              After Visit Summary   11/27/2018    Palmira Hunt    MRN: [id number]           Patient Information     Date Of Birth          1965        Visit Information        Provider Department      11/27/2018 5:40 PM Suzanne Dallas NP [facility]        Today's Diagnoses     Acute non-recurrent maxillary sinusitis    -  1       Follow-ups after your visit        Follow-up notes from your care team     Return in about 1 year (around 11/27/2019) for Annual Exam.      Who to contact     If you have questions or need follow up information about today's clinic visit or your schedule please contact [facility] directly at [phone].  Normal or non-critical lab and imaging results will be communicated to you by MyChart, letter or phone within 4 business days after the clinic has received the results. If you do not hear from us within 7 days, please contact the clinic through [employer] or phone. If you have a critical or abnormal lab result, we will notify you by phone as soon as possible.  Submit refill requests through [employer] or call your pharmacy and they will forward the refill request to us. Please allow 3 business days for your refill to be completed.          Additional Information About Your Visit        MyChart Information     [employer] gives you secure access to your electronic health record. If you see a primary care provider, you can also send messages to your care team and make appointments. If you have questions, please call your primary care clinic.  If you do not have a primary care provider, please call [phone] and they will assist you.        Care EveryWhere ID     This is your Care EveryWhere ID. This could be used by other organizations to access your [place] medical records  [id number]        Your Vitals Were     Pulse Temperature Respirations Height Pulse Oximetry BMI (Body Mass Index)    91 98.7  F (37.1  C) (Oral) 18 1.715 m (5' 7.5\") 99% 21.87 " kg/m2       Blood Pressure from Last 3 Encounters:   11/27/18 (!) 135/98   06/11/18 102/82   02/28/17 104/70    Weight from Last 3 Encounters:   11/27/18 64.3 kg (141 lb 11.8 oz)   06/11/18 61.6 kg (135 lb 11.2 oz)   02/28/17 60.7 kg (133 lb 12.8 oz)              Today, you had the following     No orders found for display         Today's Medication Changes          These changes are accurate as of 11/27/18 11:59 PM.  If you have any questions, ask your nurse or doctor.               Start taking these medicines.        Dose/Directions    azithromycin 250 MG tablet   Commonly known as:  ZITHROMAX   Used for:  Acute non-recurrent maxillary sinusitis   Started by:  Suzanne Dallas NP        Two tablets first day, then one tablet daily for four days.   Quantity:  6 tablet   Refills:  0            Where to get your medicines      These medications [facility] [place] [address] [place] [address] [facility] [address]     Phone:  [phone]     azithromycin 250 MG tablet                Primary Care Provider Office Phone # Fax #    [facility] [phone] [address] [facility] [address]        Equal Access to Services     KELLEY OLIVARES AH: Hadii hugo yusuf hadasho Soomaali, waaxda luqadaha, qaybta kaalmada adeegyada, bean callahan hayafua duran. So [facility] [phone].    ATENCIÓN: Si habla español, tiene a corbett disposición servicios gratuitos de asistencia lingüística. Llame al [phone].    We comply with applicable federal civil rights laws and [place] laws. We do not discriminate on the basis of race, color, national origin, age, disability, sex, sexual orientation, or gender identity.            Thank you!     Thank you for choosing [facility]  for your care. Our goal is always to provide you with excellent care. Hearing back from our patients is one way we can continue to improve our services. Please take a  few minutes to complete the written survey that you may receive in the mail after your visit with us. Thank you!             Your Updated Medication List - Protect others around you: Learn how to safely use, store and throw away your medicines at www.disposemymeds.org.          This list is accurate as of 11/27/18 11:59 PM.  Always use your most recent med list.                   Brand Name Dispense Instructions for use Diagnosis    azithromycin 250 MG tablet    ZITHROMAX    6 tablet    Two tablets first day, then one tablet daily for four days.    Acute non-recurrent maxillary sinusitis       venlafaxine 150 MG 24 hr capsule    EFFEXOR-XR    90 capsule    Take 1 capsule (150 mg) by mouth daily    Major depression in complete remission (H)

## 2018-11-27 NOTE — PROGRESS NOTES
SUBJECTIVE:   Palmira Hunt is a 53 year old female who presents to clinic today for the following health issues:    Acute Illness   Acute illness concerns: URI  Onset: last wednesday    Fever: no    Chills/Sweats: YES    Headache (location?): no    Sinus Pressure:YES    Conjunctivitis:  no    Ear Pain: no    Rhinorrhea: YES    Congestion: YES    Sore Throat: no      Cough: YES    Wheeze: no    Decreased Appetite: no    Nausea: no    Vomiting: no    Diarrhea:  no    Dysuria/Freq.: no    Fatigue/Achiness: YES    Sick/Strep Exposure: YES     Therapies Tried and outcome: nothing      Sinus pressure. No fever/ some chills. No sore throat. 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   Problem list and histories reviewed & adjusted, as indicated.  Additional history: as documented    Patient Active Problem List   Diagnosis     Herpes simplex virus (HSV) infection     CARDIOVASCULAR SCREENING; LDL GOAL LESS THAN 160     Papanicolaou smear of cervix with low grade squamous intraepithelial lesion (LGSIL)     Generalized anxiety disorder     Menopausal symptoms     Major depression in complete remission (H)     Past Surgical History:   Procedure Laterality Date     COLONOSCOPY N/A 7/20/2016    Procedure: COMBINED COLONOSCOPY, SINGLE OR MULTIPLE BIOPSY/POLYPECTOMY BY BIOPSY;  Surgeon: Duane, William Charles, MD;  Location:  OR     COLONOSCOPY WITH CO2 INSUFFLATION N/A 7/20/2016    Procedure: COLONOSCOPY WITH CO2 INSUFFLATION;  Surgeon: Duane, William Charles, MD;  Location:  OR     D & C       HYSTERECTOMY, VAGINAL  2002    for cervical dysplasia     MOUTH SURGERY      infected tooth, hospitalized for 8 days in 2/2017     SLING TRANSVAGINAL  6/27/2013    Procedure: SLING TRANSVAGINAL;  Cysto with TVT;  Surgeon: Michael Nicole MD;  Location:  OR     TONSILLECTOMY & ADENOIDECTOMY         Social History   Substance Use Topics     Smoking status: Never Smoker     Smokeless tobacco: Never Used      "Alcohol use Yes      Comment: occasional, 3 drinks a week     Family History   Problem Relation Age of Onset     Cancer Mother      lung-smoker     Depression Mother      Hypertension Mother      Asthma No family hx of      C.A.D. No family hx of      Diabetes No family hx of      Cerebrovascular Disease No family hx of      Breast Cancer No family hx of      Cancer - colorectal No family hx of      Prostate Cancer No family hx of      Colon Cancer No family hx of            Reviewed and updated as needed this visit by clinical staff  Tobacco  Allergies  Meds  Problems  Med Hx  Surg Hx  Fam Hx  Soc Hx        Reviewed and updated as needed this visit by Provider  Allergies  Meds  Problems         ROS:  Constitutional, HEENT-as above, cardiovascular, pulmonary, gi and gu systems are negative, except as otherwise noted.    OBJECTIVE:     BP (!) 135/98  Pulse 91  Temp 98.7  F (37.1  C) (Oral)  Resp 18  Ht 1.715 m (5' 7.5\")  Wt 64.3 kg (141 lb 11.8 oz)  SpO2 99%  BMI 21.87 kg/m2  Body mass index is 21.87 kg/(m^2).  GENERAL: healthy, alert and no distress  EYES: Eyes grossly normal to inspection, PERRL and conjunctivae and sclerae normal  HENT: ear canals and TM's normal, nose and mouth without ulcers or lesions, +maxillary and frontal sinus pressure  NECK: no adenopathy, no asymmetry, masses, or scars and thyroid normal to palpation  RESP: lungs clear to auscultation - no rales, rhonchi or wheezes  CV: regular rate and rhythm, normal S1 S2, no S3 or S4, no murmur, click or rub  Diagnostic Test Results:  none     ASSESSMENT/PLAN:       1. Acute non-recurrent maxillary sinusitis  Will treat. Call if not improving.   - azithromycin (ZITHROMAX) 250 MG tablet; Two tablets first day, then one tablet daily for four days.  Dispense: 6 tablet; Refill: 0    FUTURE APPOINTMENTS:       - Follow-up visit in June 2019 for annual exam    -call for refills of antidepressant    CINDY Becker, NP-C  [facility] " [place]

## 2018-11-28 ASSESSMENT — ANXIETY QUESTIONNAIRES: GAD7 TOTAL SCORE: 0

## 2018-12-14 ENCOUNTER — E-VISIT (OUTPATIENT)
Dept: FAMILY MEDICINE | Facility: CLINIC | Age: 53
End: 2018-12-14
Payer: COMMERCIAL

## 2018-12-14 DIAGNOSIS — J01.80 OTHER ACUTE SINUSITIS, RECURRENCE NOT SPECIFIED: Primary | ICD-10-CM

## 2018-12-14 PROCEDURE — 99444 ZZC PHYSICIAN ONLINE EVALUATION & MANAGEMENT SERVICE: CPT | Performed by: PHYSICIAN ASSISTANT

## 2018-12-14 RX ORDER — LEVOFLOXACIN 500 MG/1
500 TABLET, FILM COATED ORAL DAILY
Qty: 10 TABLET | Refills: 0 | Status: SHIPPED | OUTPATIENT
Start: 2018-12-14 | End: 2018-12-24

## 2019-02-22 ENCOUNTER — E-VISIT (OUTPATIENT)
Dept: FAMILY MEDICINE | Facility: CLINIC | Age: 54
End: 2019-02-22

## 2019-02-22 DIAGNOSIS — J01.90 ACUTE SINUSITIS WITH SYMPTOMS > 10 DAYS: Primary | ICD-10-CM

## 2019-02-22 PROCEDURE — 99444 ZZC PHYSICIAN ONLINE EVALUATION & MANAGEMENT SERVICE: CPT | Performed by: PHYSICIAN ASSISTANT

## 2019-02-23 RX ORDER — LEVOFLOXACIN 500 MG/1
500 TABLET, FILM COATED ORAL DAILY
Qty: 10 TABLET | Refills: 0 | Status: SHIPPED | OUTPATIENT
Start: 2019-02-23 | End: 2019-07-08

## 2019-06-18 DIAGNOSIS — F32.5 MAJOR DEPRESSION IN COMPLETE REMISSION (H): ICD-10-CM

## 2019-06-18 NOTE — TELEPHONE ENCOUNTER
"Requested Prescriptions   Pending Prescriptions Disp Refills     venlafaxine (EFFEXOR-XR) 150 MG 24 hr capsule  Last Written Prescription Date:  6/11/18  Last Fill Quantity: 90 capsule,  # refills: 3   Last office visit: 11/27/2018 with prescribing provider:  Suzanne Dallas NP   Future Office Visit:     90 capsule 3     Sig: Take 1 capsule (150 mg) by mouth daily       Serotonin-Norepinephrine Reuptake Inhibitors  Failed - 6/18/2019  9:53 AM        Failed - Blood pressure under 140/90 in past 12 months     BP Readings from Last 3 Encounters:   11/27/18 (!) 135/98   06/11/18 102/82   02/28/17 104/70                 Failed - PHQ-9 score of less than 5 in past 6 months     Please review last PHQ-9 score.           Failed - Normal serum creatinine on file in past 12 months     Recent Labs   Lab Test 06/15/11  1137   CR 0.78             Failed - Recent (6 mo) or future (30 days) visit within the authorizing provider's specialty     Patient had office visit in the last 6 months or has a visit in the next 30 days with authorizing provider or within the authorizing provider's specialty.  See \"Patient Info\" tab in inbasket, or \"Choose Columns\" in Meds & Orders section of the refill encounter.            Passed - Medication is active on med list        Passed - Patient is age 18 or older        Passed - No active pregnancy on record        Passed - No positive pregnancy test in past 12 months          "

## 2019-06-20 RX ORDER — VENLAFAXINE HYDROCHLORIDE 150 MG/1
150 CAPSULE, EXTENDED RELEASE ORAL DAILY
Qty: 60 CAPSULE | Refills: 0 | Status: SHIPPED | OUTPATIENT
Start: 2019-06-20 | End: 2019-11-29

## 2019-06-20 NOTE — TELEPHONE ENCOUNTER
Routing refill request to provider for review/approval because:  Labs out of range:  Blood pressure  Labs not current:  PHQ-9, serum creatinine  Patient has not been seen in clinic since Nov 2018    Teresa Cortes RN, BSN

## 2019-07-08 ENCOUNTER — RESULT FOLLOW UP (OUTPATIENT)
Dept: FAMILY MEDICINE | Facility: CLINIC | Age: 54
End: 2019-07-08

## 2019-07-08 ENCOUNTER — OFFICE VISIT (OUTPATIENT)
Dept: FAMILY MEDICINE | Facility: CLINIC | Age: 54
End: 2019-07-08
Payer: COMMERCIAL

## 2019-07-08 VITALS
HEART RATE: 69 BPM | SYSTOLIC BLOOD PRESSURE: 117 MMHG | WEIGHT: 144 LBS | OXYGEN SATURATION: 100 % | DIASTOLIC BLOOD PRESSURE: 75 MMHG | HEIGHT: 67 IN | BODY MASS INDEX: 22.6 KG/M2 | TEMPERATURE: 98.2 F | RESPIRATION RATE: 20 BRPM

## 2019-07-08 DIAGNOSIS — Z00.00 ROUTINE GENERAL MEDICAL EXAMINATION AT A HEALTH CARE FACILITY: Primary | ICD-10-CM

## 2019-07-08 DIAGNOSIS — Z12.4 SCREENING FOR MALIGNANT NEOPLASM OF CERVIX: ICD-10-CM

## 2019-07-08 DIAGNOSIS — Z13.220 SCREENING FOR HYPERLIPIDEMIA: ICD-10-CM

## 2019-07-08 DIAGNOSIS — R87.612 PAPANICOLAOU SMEAR OF CERVIX WITH LOW GRADE SQUAMOUS INTRAEPITHELIAL LESION (LGSIL): ICD-10-CM

## 2019-07-08 DIAGNOSIS — Z12.31 ENCOUNTER FOR SCREENING MAMMOGRAM FOR BREAST CANCER: ICD-10-CM

## 2019-07-08 PROCEDURE — 87624 HPV HI-RISK TYP POOLED RSLT: CPT | Performed by: NURSE PRACTITIONER

## 2019-07-08 PROCEDURE — 99396 PREV VISIT EST AGE 40-64: CPT | Performed by: NURSE PRACTITIONER

## 2019-07-08 PROCEDURE — G0123 SCREEN CERV/VAG THIN LAYER: HCPCS | Performed by: NURSE PRACTITIONER

## 2019-07-08 ASSESSMENT — PAIN SCALES - GENERAL: PAINLEVEL: MODERATE PAIN (4)

## 2019-07-08 ASSESSMENT — MIFFLIN-ST. JEOR: SCORE: 1280.81

## 2019-07-08 NOTE — PROGRESS NOTES
SUBJECTIVE:   CC: Palmira Hunt is an 53 year old woman who presents for preventive health visit.     Healthy Habits:    Do you get at least three servings of calcium containing foods daily (dairy, green leafy vegetables, etc.)? yes    Amount of exercise or daily activities, outside of work: 3 day(s) per week-walking and working out    Problems taking medications regularly No    Medication side effects: No    Have you had an eye exam in the past two years? No appt tomorrow    Do you see a dentist twice per year? yes    Do you have sleep apnea, excessive snoring or daytime drowsiness?no    Gets medication for mood elsewhere than pharmacy via [facility]. Mood is good.       Today's PHQ-2 Score:   PHQ-2 ( 1999 [employer]) 7/8/2019 6/11/2018   Q1: Little interest or pleasure in doing things 0 0   Q2: Feeling down, depressed or hopeless 0 0   PHQ-2 Score 0 0       Abuse: Current or Past(Physical, Sexual or Emotional)- No  Do you feel safe in your environment? Yes    Social History     Tobacco Use     Smoking status: Never Smoker     Smokeless tobacco: Never Used   Substance Use Topics     Alcohol use: Yes     Comment: occasional, 3 drinks a week     If you drink alcohol do you typically have >3 drinks per day or >7 drinks per week? No                     Reviewed orders with patient.  Reviewed health maintenance and updated orders accordingly - Yes  Lab work is in process    Mammogram Screening: Patient over age 50, mutual decision to screen reflected in health maintenance.    Pertinent mammograms are reviewed under the imaging tab.  History of abnormal Pap smear: Status post hysterectomy. Pap still indicated. yearly  PAP / HPV Latest Ref Rng & Units 6/11/2018 11/13/2015 6/18/2013   PAP - NIL NIL NIL   HPV 16 DNA NEG:Negative Negative - -   HPV 18 DNA NEG:Negative Negative - -   OTHER HR HPV NEG:Negative Positive(A) - -     Reviewed and updated as needed this visit by clinical staff  Tobacco  Allergies  Meds   "Problems  Med Hx  Surg Hx  Fam Hx  Soc Hx          Reviewed and updated as needed this visit by Provider  Tobacco  Allergies  Meds  Problems  Med Hx  Surg Hx  Fam Hx            ROS:  CONSTITUTIONAL: NEGATIVE for fever, chills, change in weight  INTEGUMENTARY/SKIN: NEGATIVE for worrisome rashes, moles or lesions  EYES: NEGATIVE for vision changes or irritation  ENT: NEGATIVE for ear, mouth and throat problems  RESP: NEGATIVE for significant cough or SOB  BREAST: NEGATIVE for masses, tenderness or discharge  CV: NEGATIVE for chest pain, palpitations or peripheral edema  GI: NEGATIVE for nausea, abdominal pain, heartburn, or change in bowel habits  : NEGATIVE for unusual urinary or vaginal symptoms. No vaginal bleeding.  MUSCULOSKELETAL: NEGATIVE for significant arthralgias or myalgia  NEURO: NEGATIVE for weakness, dizziness or paresthesias  PSYCHIATRIC: NEGATIVE for changes in mood or affect     OBJECTIVE:   /75 (BP Location: Right arm, Patient Position: Sitting, Cuff Size: Adult Regular)   Pulse 69   Temp 98.2  F (36.8  C) (Oral)   Resp 20   Ht 1.702 m (5' 7\")   Wt 65.3 kg (144 lb)   SpO2 100%   BMI 22.55 kg/m    EXAM:  GENERAL: healthy, alert and no distress  EYES: Eyes grossly normal to inspection, PERRL and conjunctivae and sclerae normal  HENT: ear canals and TM's normal, nose and mouth without ulcers or lesions  NECK: no adenopathy, no asymmetry, masses, or scars and thyroid normal to palpation  RESP: lungs clear to auscultation - no rales, rhonchi or wheezes  BREAST: normal without masses, tenderness or nipple discharge and no palpable axillary masses or adenopathy  CV: regular rate and rhythm, normal S1 S2, no S3 or S4, no murmur, click or rub  ABDOMEN: soft, nontender, no hepatosplenomegaly, no masses and bowel sounds normal   (female): normal female external genitalia, normal urethral meatus, vaginal mucosa pink, moist, well rugated, and cervix not present, s/p hysterectomy  MS: " "no gross musculoskeletal defects noted, no edema  SKIN: no suspicious lesions or rashes  NEURO: Normal strength and tone, mentation intact and speech normal  PSYCH: mentation appears normal, affect normal/bright    Diagnostic Test Results:  Labs reviewed in Epic  No results found for this or any previous visit (from the past 24 hour(s)).    ASSESSMENT/PLAN:   1. Routine general medical examination at a health care facility  Return for fasting labs  - Lipid panel reflex to direct LDL Fasting; Future  - Glucose; Future  - **Basic metabolic panel FUTURE anytime; Future    2. Screening for hyperlipidemia  As above  - Lipid panel reflex to direct LDL Fasting; Future    3. Screening for malignant neoplasm of cervix  Screening, +HPV other last year  - Pap imaged thin layer screen with HPV - recommended age 30 - 65 years (select HPV order below)  - HPV High Risk Types DNA Cervical    4. Encounter for screening mammogram for breast cancer  screening  - MA SCREENING DIGITAL BILAT - Future  (s+30); Future    Colonoscopy done in 2016    COUNSELING:   Reviewed preventive health counseling, as reflected in patient instructions    Estimated body mass index is 22.55 kg/m  as calculated from the following:    Height as of this encounter: 1.702 m (5' 7\").    Weight as of this encounter: 65.3 kg (144 lb).         reports that she has never smoked. She has never used smokeless tobacco.      Counseling Resources:  ATP IV Guidelines  Pooled Cohorts Equation Calculator  Breast Cancer Risk Calculator  FRAX Risk Assessment  ICSI Preventive Guidelines  Dietary Guidelines for Americans, 2010  USDA's MyPlate  ASA Prophylaxis  Lung CA Screening    Follow up with provider in 1 year or prn    CINDY Becker, NP-C  [facility]    "

## 2019-07-10 DIAGNOSIS — Z13.220 SCREENING FOR HYPERLIPIDEMIA: ICD-10-CM

## 2019-07-10 DIAGNOSIS — Z00.00 ROUTINE GENERAL MEDICAL EXAMINATION AT A HEALTH CARE FACILITY: ICD-10-CM

## 2019-07-10 LAB
ANION GAP SERPL CALCULATED.3IONS-SCNC: 6 MMOL/L (ref 3–14)
BUN SERPL-MCNC: 17 MG/DL (ref 7–30)
CALCIUM SERPL-MCNC: 9.2 MG/DL (ref 8.5–10.1)
CHLORIDE SERPL-SCNC: 105 MMOL/L (ref 94–109)
CHOLEST SERPL-MCNC: 263 MG/DL
CO2 SERPL-SCNC: 27 MMOL/L (ref 20–32)
COPATH REPORT: NORMAL
CREAT SERPL-MCNC: 0.64 MG/DL (ref 0.52–1.04)
GFR SERPL CREATININE-BSD FRML MDRD: >90 ML/MIN/{1.73_M2}
GLUCOSE SERPL-MCNC: 90 MG/DL (ref 70–99)
HDLC SERPL-MCNC: 110 MG/DL
LDLC SERPL CALC-MCNC: 141 MG/DL
NONHDLC SERPL-MCNC: 153 MG/DL
PAP: NORMAL
POTASSIUM SERPL-SCNC: 4.5 MMOL/L (ref 3.4–5.3)
SODIUM SERPL-SCNC: 138 MMOL/L (ref 133–144)
TRIGL SERPL-MCNC: 62 MG/DL

## 2019-07-10 PROCEDURE — 80061 LIPID PANEL: CPT | Performed by: NURSE PRACTITIONER

## 2019-07-10 PROCEDURE — 36415 COLL VENOUS BLD VENIPUNCTURE: CPT | Performed by: NURSE PRACTITIONER

## 2019-07-10 PROCEDURE — 80048 BASIC METABOLIC PNL TOTAL CA: CPT | Performed by: NURSE PRACTITIONER

## 2019-07-12 NOTE — PROGRESS NOTES
2002- history of TVH for cervical dysplasia.  5/6/09 pap LSIL  2/25/10 colpo/repeat pap- NIL. Plan-- repeat pap smear in 6 months at time of annual exam  7/15/10 pap NIL/neg HPV.   10/20/11 no pap per Dr Duran.  Annual exam 1 year.  6/18/13 NIL pap, neg HR HPV. Plan; pap in 3 years  11/15/13 NIL pap. Plan: pap in 3 years.  6/11/18 NIL pap, + HR HPV (not 16 or 18) Plan: cotest in 1 yr.  7/1/19 Lost to follow-up for pap tracking  7/8/19 NIL vaginal Pap, + HR HPV (Neg 16/18). Plan pap in 1 year per provider.(MRA)

## 2019-10-05 ENCOUNTER — HEALTH MAINTENANCE LETTER (OUTPATIENT)
Age: 54
End: 2019-10-05

## 2019-11-08 ENCOUNTER — VIRTUAL VISIT (OUTPATIENT)
Dept: FAMILY MEDICINE | Facility: OTHER | Age: 54
End: 2019-11-08

## 2019-11-08 NOTE — PROGRESS NOTES
"Date: 2019 16:25:21  Clinician: Mau Hollingsworth  Clinician NPI: [id number]  Patient: Jazmin Hunt  Patient : 1965  Patient Address[address] [address] [place], [place] [address] [address]  Patient Phone: [phone]  Visit Protocol: URI  Patient Summary:  Jazmin is a 54 year old ( : 1965 ) female who initiated a Visit for cold, sinus infection, or influenza. When asked the question \"Please sign me up to receive news, health information and promotions. \", Jazmin responded \"No\".    Jazmin states her symptoms started gradually 3-6 days ago.   Her symptoms consist of a sore throat, malaise, a cough, facial pain or pressure, enlarged lymph nodes, and nasal congestion.   Symptom details     Nasal secretions: The color of her mucus is yellow.    Cough: Jazmin coughs every 5-10 minutes and her cough is more bothersome at night. Phlegm comes into her throat when she coughs. She does not believe the phlegm causes the cough. The color of the phlegm is green and yellow.     Sore throat: Jazmin reports having mild throat pain (1-3 on a 10 point pain scale), does not have exudate on her tonsils, and can swallow liquids. The lymph nodes in her neck are enlarged. A rash has not appeared on the skin since the sore throat started.     Facial pain or pressure: The facial pain or pressure feels worse when bending over or leaning forward.      Jazmin denies having headache, rhinitis, wheezing, teeth pain, fever, myalgias, chills, and ear pain. She also denies having recent facial or sinus surgery in the past 60 days, double sickening (worsening symptoms after initial improvement), and taking antibiotic medication for the symptoms. She is not experiencing dyspnea.   Precipitating events  Within the past week, Jazmin has not been exposed to someone with strep throat. She has not recently been exposed to someone with influenza. Jazmin has not been in close contact with any high risk individuals.   Pertinent medical history  Jazmin had 1 " sinus infection within the past year.   Jazmin does not get yeast infections when she takes antibiotics.   Weight: 138 lbs   Jazmin does not smoke or use smokeless tobacco.     MEDICATIONS: venlafaxine oral, ALLERGIES: Penicillins  Clinician Response:  Dear Jazmin,  Based on the information provided, you have viral sinusitis, also known as a sinus infection. Sinus infections are caused by bacteria or a virus and symptoms are almost always identical. The difference between the 2 types of infections is timing.  Sinus infections start as viral infections and symptoms improve on their own in about 7 days. If symptoms have not improved after 7 days or have even worsened, a bacterial infection may have developed.  Medication information  Because you have a viral infection, antibiotics will not help you get better. Treating a viral infection with antibiotics could actually make you feel worse.  For more information on why I am not prescribing antibiotics, please watch this video: Antibiotics Aren't Always the Answer.  I am prescribing:       Fluticasone 50 mcg/actuation nasal spray. Inhale 2 sprays in each nostril 1 time per day; after 1 week, may adjust to 1 - 2 sprays in each nostril 1 time per day. This medication takes several days to start working, so keep taking it even if it doesn't help right away. There are no refills with this prescription.      Benzonatate (Tessalon Perles) 100 mg oral capsule. Take 1-2 capsules by mouth 3 times per day as needed for your cough. There are no refills with this prescription.     Self care  The following tips will keep you as comfortable as possible while you recover:     Rest    Drink plenty of water and other liquids    Take a hot shower to loosen congestion    Use throat lozenges    Gargle with warm salt water (1/4 teaspoon of salt per 8 ounce glass of water)    Suck on frozen items such as popsicles or ice cubes    Drink hot tea with lemon and honey    Take a spoonful of honey to  reduce your cough     When to seek care  Please be seen in a clinic or urgent care if new symptoms develop, or symptoms become worse.  Call 911 or go to the emergency room if you feel that your throat is closing off, you suddenly develop a rash, you are unable to swallow fluids, you are drooling, or you are having difficulty breathing.   Diagnosis: Viral sinusitis  Diagnosis ICD: J01.90  Prescription: fluticasone 50 mcg/actuation nasal spray,suspension 1 120 spray aerosol with adapter (grams), 30 days supply. Inhale 2 sprays in each nostril 1 time per day; after 1 week, may adjust to 1 - 2 sprays in each nostril 1 time per day.. Refills: 0, Refill as needed: no, Allow substitutions: yes  Prescription: benzonatate (Tessalon Perles) 100 mg oral capsule 30 capsule, 5 days supply. Take 1-2 capsules by mouth 3 times per day as needed. Refills: 0, Refill as needed: no, Allow substitutions: yes

## 2019-11-14 ENCOUNTER — OFFICE VISIT (OUTPATIENT)
Dept: FAMILY MEDICINE | Facility: CLINIC | Age: 54
End: 2019-11-14
Payer: COMMERCIAL

## 2019-11-14 VITALS
SYSTOLIC BLOOD PRESSURE: 139 MMHG | DIASTOLIC BLOOD PRESSURE: 84 MMHG | WEIGHT: 142 LBS | BODY MASS INDEX: 22.29 KG/M2 | RESPIRATION RATE: 16 BRPM | HEIGHT: 67 IN | OXYGEN SATURATION: 98 % | TEMPERATURE: 98.8 F | HEART RATE: 82 BPM

## 2019-11-14 DIAGNOSIS — H66.90 ACUTE OTITIS MEDIA, UNSPECIFIED OTITIS MEDIA TYPE: Primary | ICD-10-CM

## 2019-11-14 DIAGNOSIS — J01.90 ACUTE SINUSITIS TREATED WITH ANTIBIOTICS IN THE PAST 60 DAYS: ICD-10-CM

## 2019-11-14 DIAGNOSIS — R07.0 THROAT PAIN: ICD-10-CM

## 2019-11-14 LAB
DEPRECATED S PYO AG THROAT QL EIA: ABNORMAL
SPECIMEN SOURCE: ABNORMAL

## 2019-11-14 PROCEDURE — 99213 OFFICE O/P EST LOW 20 MIN: CPT | Performed by: FAMILY MEDICINE

## 2019-11-14 PROCEDURE — 87880 STREP A ASSAY W/OPTIC: CPT | Performed by: FAMILY MEDICINE

## 2019-11-14 RX ORDER — FLUTICASONE PROPIONATE 50 MCG
2 SPRAY, SUSPENSION (ML) NASAL DAILY
Qty: 16 G | Refills: 1 | Status: SHIPPED | OUTPATIENT
Start: 2019-11-14 | End: 2019-12-06

## 2019-11-14 RX ORDER — CEFDINIR 300 MG/1
300 CAPSULE ORAL 2 TIMES DAILY
COMMUNITY
End: 2020-02-11

## 2019-11-14 RX ORDER — LEVOFLOXACIN 500 MG/1
500 TABLET, FILM COATED ORAL DAILY
Qty: 7 TABLET | Refills: 0 | Status: SHIPPED | OUTPATIENT
Start: 2019-11-14 | End: 2020-02-11

## 2019-11-14 ASSESSMENT — ANXIETY QUESTIONNAIRES
3. WORRYING TOO MUCH ABOUT DIFFERENT THINGS: NOT AT ALL
7. FEELING AFRAID AS IF SOMETHING AWFUL MIGHT HAPPEN: NOT AT ALL
6. BECOMING EASILY ANNOYED OR IRRITABLE: NOT AT ALL
2. NOT BEING ABLE TO STOP OR CONTROL WORRYING: NOT AT ALL
1. FEELING NERVOUS, ANXIOUS, OR ON EDGE: NOT AT ALL
GAD7 TOTAL SCORE: 0
IF YOU CHECKED OFF ANY PROBLEMS ON THIS QUESTIONNAIRE, HOW DIFFICULT HAVE THESE PROBLEMS MADE IT FOR YOU TO DO YOUR WORK, TAKE CARE OF THINGS AT HOME, OR GET ALONG WITH OTHER PEOPLE: NOT DIFFICULT AT ALL
5. BEING SO RESTLESS THAT IT IS HARD TO SIT STILL: NOT AT ALL

## 2019-11-14 ASSESSMENT — PATIENT HEALTH QUESTIONNAIRE - PHQ9
5. POOR APPETITE OR OVEREATING: NOT AT ALL
SUM OF ALL RESPONSES TO PHQ QUESTIONS 1-9: 0

## 2019-11-14 ASSESSMENT — MIFFLIN-ST. JEOR: SCORE: 1266.74

## 2019-11-14 NOTE — PROGRESS NOTES
Subjective     Palmira Hunt is a 54 year old female who presents to clinic today for the following health issues:    HPI   Acute Illness   Acute illness concerns: Ear infection   Onset: 11/5/19    Fever: no     Chills/Sweats: YES    Headache (location?): YES, mild     Sinus Pressure:YES    Conjunctivitis:  YES: left    Ear Pain: YES: bilateral    Rhinorrhea: YES    Congestion: YES, nasal     Sore Throat: YES     Cough: YES-productive of yellow sputum, productive of green sputum    Wheeze: no     Decreased Appetite: YES    Nausea: no    Vomiting: no    Diarrhea:  no    Dysuria/Freq.: no    Fatigue/Achiness: YES    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 5 days ago she went to [facility] clinic and was given cefdinir for a sinus and has not felt any improvement in symptoms. At that time she was told she has fluid behind her ears, 3 days ago she started to get ear pain that is worsening. Has lots of post nasal drainage, sweats/chills at night, rhinorrhea, nasal congestion  -Frequently gets sinus infections   -Denies wheezing, SOB, chance of pregnancy      Patient Active Problem List   Diagnosis     Herpes simplex virus (HSV) infection     CARDIOVASCULAR SCREENING; LDL GOAL LESS THAN 160     Papanicolaou smear of cervix with low grade squamous intraepithelial lesion (LGSIL)     Generalized anxiety disorder     Menopausal symptoms     Major depression in complete remission (H)     Past Surgical History:   Procedure Laterality Date     COLONOSCOPY N/A 7/20/2016    Procedure: COMBINED COLONOSCOPY, SINGLE OR MULTIPLE BIOPSY/POLYPECTOMY BY BIOPSY;  Surgeon: Duane, William Charles, MD;  Location: [facility] OR     COLONOSCOPY WITH CO2 INSUFFLATION N/A 7/20/2016    Procedure: COLONOSCOPY WITH CO2 INSUFFLATION;   "Surgeon: Duane, William Charles, MD;  Location: [facility] OR     D & C       HYSTERECTOMY, VAGINAL  2002    for cervical dysplasia     MOUTH SURGERY      infected tooth, hospitalized for 8 days in 2/2017     SLING TRANSVAGINAL  6/27/2013    Procedure: SLING TRANSVAGINAL;  Cysto with TVT;  Surgeon: Michael Nicole MD;  Location: [facility] OR     TONSILLECTOMY & ADENOIDECTOMY         Social History     Tobacco Use     Smoking status: Never Smoker     Smokeless tobacco: Never Used   Substance Use Topics     Alcohol use: Yes     Comment: occasional, 3 drinks a week     Family History   Problem Relation Age of Onset     Cancer Mother         lung-smoker     Depression Mother      Hypertension Mother      Asthma No family hx of      C.A.D. No family hx of      Diabetes No family hx of      Cerebrovascular Disease No family hx of      Breast Cancer No family hx of      Cancer - colorectal No family hx of      Prostate Cancer No family hx of      Colon Cancer No family hx of              Reviewed and updated as needed this visit by Provider         Review of Systems   ROS COMP: Constitutional, HEENT, cardiovascular, pulmonary, gi and gu systems are negative, except as otherwise noted.    This document serves as a record of the services and decisions personally performed by KIMBERLI FRIAS. It was created on his/her behalf by Betty Yi, a trained medical scribe. The creation of this document is based on the provider's statements to the medical scribe. Betty Yi, November 14, 2019 9:59 AM        Objective    /84 (BP Location: Right arm, Patient Position: Sitting, Cuff Size: Adult Regular)   Pulse 82   Temp 98.8  F (37.1  C) (Oral)   Resp 16   Ht 1.702 m (5' 7\")   Wt 64.4 kg (142 lb)   SpO2 98%   BMI 22.24 kg/m    Body mass index is 22.24 kg/m .  Physical Exam   GENERAL: healthy, alert and no distress  EYES: Eyes grossly normal to inspection, PERRL and conjunctivae and sclerae normal  HENT: air fluid " level seen behind left TM, right TM posterior aspect red and bulging, mild erythema posterior pharynx, ear canals normal, nose and mouth without ulcers or lesions, frontal and maxillary sinus tenderness  NECK: no adenopathy, no asymmetry, masses, or scars and thyroid normal to palpation  RESP: lungs clear to auscultation - no rales, rhonchi or wheezes  CV: regular rate and rhythm, normal S1 S2, no S3 or S4, no murmur, click or rub, no peripheral edema and peripheral pulses strong  ABDOMEN: soft, nontender, no hepatosplenomegaly, no masses and bowel sounds normal  MS: no gross musculoskeletal defects noted, no edema  PSYCH: mentation appears normal, affect normal/bright    Diagnostic Test Results:  Labs reviewed in Epic    Results for orders placed or performed in visit on 11/14/19   Rapid strep screen     Status: Abnormal   Result Value Ref Range    Specimen Description Throat     Rapid Strep A Screen (A)      POSITIVE: Group A Streptococcal antigen detected by immunoassay.             Assessment & Plan     1. Acute otitis media, unspecified otitis media type  2. Acute sinusitis treated with antibiotics in the past 60 days  3. Strep + (unclear if this is colonization vs acute infection given exam/hx)  Given pcn allergy and failure of cephalosporin will Start levaquin and Flonase for symptoms. Encouraged use of nettipot, and stopping dayquil/nyquil and trying ibuprofen and Mucinex.  Reviewed timing of taking, onset, benefits, monitoring and typical and severe AE of the medication.  - levofloxacin (LEVAQUIN) 500 MG tablet; Take 1 tablet (500 mg) by mouth daily  Dispense: 7 tablet; Refill: 0  - fluticasone (FLONASE) 50 MCG/ACT nasal spray; Spray 2 sprays into both nostrils daily  Dispense: 16 g; Refill: 1    3. Throat pain  - Rapid strep screen       Patient Instructions     I recommend you start Flonase nasal spray, two sprays in each nostril twice daily. Use your right hand to spray into your left nostril, and left  hand to spray into your right nostril to avoid nasal irritation. You can get this over the counter.      Continue using nettipot.     Stop cefdinir. Take Levaquin once daily for one week.    Consider using ibuprofen and Mucinex for symptoms instead of dayquil/nyquil.    At [facility], we strive to deliver an exceptional experience to you, every time we see you. If you receive a survey, please complete it as we do value your feedback.  If you have MyChart, you can expect to receive results automatically within 24 hours of their completion.  Your provider will send a note interpreting your results as well.   If you do not have MyChart, you should receive your results in about a week by mail.    Your care team:     Family Medicine   MARIELA Bourne MD Emily Bunt, CINDY [facility]   S. MD Deloris Bello MD Angela Wermerskirchen, MD    Internal Medicine  Gagan Trimble MD [facility] 2020     Clinic hours: Monday - Wednesday 7 am-7 pm   Thursdays and Fridays 7 am-5 pm.     [facility]: Monday - Friday 11 am-9 pm,   Saturday and Sunday 9 am-5 pm.    [facility]: Monday -Thursday 8 am-8 pm; Friday 8 am-6 pm; Saturday and Sunday 9 am-5 pm.     [facility]: Monday - Thursday 8 am - 7 pm; Friday 8 am - 6 pm    Clinic: [phone]   [facility]: [phone]   [facility]: [phone]                No follow-ups on file.  Length of visit was 15 minutes with more than 50 percent of that time used for discussing medical concerns and education    The information in this document, created by the medical scribe for me, accurately reflects the services I personally performed and the decisions made by me. I have reviewed and approved this document for accuracy.   Maria G Keys MD  [facility]

## 2019-11-14 NOTE — PATIENT INSTRUCTIONS
I recommend you start Flonase nasal spray, two sprays in each nostril twice daily. Use your right hand to spray into your left nostril, and left hand to spray into your right nostril to avoid nasal irritation. You can get this over the counter.      Continue using nettipot.     Stop cefdinir. Take Levaquin once daily for one week.    Consider using ibuprofen and Mucinex for symptoms instead of dayquil/nyquil.    At [facility], we strive to deliver an exceptional experience to you, every time we see you. If you receive a survey, please complete it as we do value your feedback.  If you have MyChart, you can expect to receive results automatically within 24 hours of their completion.  Your provider will send a note interpreting your results as well.   If you do not have MyChart, you should receive your results in about a week by mail.    Your care team:     Family Medicine   MARIELA Bourne MD Emily Bunt, APRGWENDOLYN [facility]   S. MD Deloris Bello MD Angela Wermerskirchen, MD    Internal Medicine  Gagan Trimble MD coming 2020     Clinic hours: Monday - Wednesday 7 am-7 pm   Thursdays and Fridays 7 am-5 pm.     [facility]: Monday - Friday 11 am-9 pm,   Saturday and Sunday 9 am-5 pm.    [facility]: Monday -Thursday 8 am-8 pm; Friday 8 am-6 pm; Saturday and Sunday 9 am-5 pm.     [facility]: Monday - Thursday 8 am - 7 pm; Friday 8 am - 6 pm    Clinic: [phone]   [facility]: [phone] m [facility]: [phone]

## 2019-11-15 ASSESSMENT — ANXIETY QUESTIONNAIRES: GAD7 TOTAL SCORE: 0

## 2019-11-25 DIAGNOSIS — F32.5 MAJOR DEPRESSION IN COMPLETE REMISSION (H): ICD-10-CM

## 2019-11-25 NOTE — TELEPHONE ENCOUNTER
"Requested Prescriptions   Pending Prescriptions Disp Refills     venlafaxine (EFFEXOR-XR) 150 MG 24 hr capsule 60 capsule 0     Sig: Take 1 capsule (150 mg) by mouth daily       Serotonin-Norepinephrine Reuptake Inhibitors  Passed - 11/25/2019  9:17 AM        Passed - Blood pressure under 140/90 in past 12 months     BP Readings from Last 3 Encounters:   11/14/19 139/84   07/08/19 117/75   11/27/18 (!) 135/98                 Passed - PHQ-9 score of less than 5 in past 6 months     Please review last PHQ-9 score.           Passed - Medication is active on med list        Passed - Patient is age 18 or older        Passed - No active pregnancy on record        Passed - Normal serum creatinine on file in past 12 months     Recent Labs   Lab Test 07/10/19  1058   CR 0.64             Passed - No positive pregnancy test in past 12 months        Passed - Recent (6 mo) or future (30 days) visit within the authorizing provider's specialty     Patient had office visit in the last 6 months or has a visit in the next 30 days with authorizing provider or within the authorizing provider's specialty.  See \"Patient Info\" tab in inbasket, or \"Choose Columns\" in Meds & Orders section of the refill encounter.            venlafaxine (EFFEXOR-XR) 150 MG 24 hr capsule  Last Written Prescription Date:  6/20/19  Last Fill Quantity: 60,  # refills: 0   Last office visit: 11/14/2019 with prescribing provider:  Suzanne Dallas   Future Office Visit:      "

## 2019-11-29 RX ORDER — VENLAFAXINE HYDROCHLORIDE 150 MG/1
150 CAPSULE, EXTENDED RELEASE ORAL DAILY
Qty: 90 CAPSULE | Refills: 0 | Status: SHIPPED | OUTPATIENT
Start: 2019-11-29 | End: 2020-03-12

## 2019-11-29 NOTE — TELEPHONE ENCOUNTER
Routing refill request to provider for review/approval because:  A break in medication  Medication has not been prescribed since June-was only given 2 months supply at that time.    Leticia Smith RN  [facility]

## 2019-12-02 NOTE — TELEPHONE ENCOUNTER
Please call or send [employer] message: patient is due for a visit to talk about her mood. E-visit, phone, or in person visit is okay.  CINDY Becker, NP-C  [facility]

## 2019-12-06 DIAGNOSIS — J01.90 ACUTE SINUSITIS TREATED WITH ANTIBIOTICS IN THE PAST 60 DAYS: ICD-10-CM

## 2019-12-06 NOTE — TELEPHONE ENCOUNTER
"Requested Prescriptions   Pending Prescriptions Disp Refills     fluticasone (FLONASE) 50 MCG/ACT nasal spray [Pharmacy Med Name: FLUTICASONE PROP 50 MCG SPRAY]  Last Written Prescription Date:  11/14/19  Last Fill Quantity: 16 g,  # refills: 1   Last office visit: 11/14/2019 with prescribing provider:  Dr. Keys  Future Office Visit:     16 mL 1     Sig: INSTILL 2 SPRAYS INTO BOTH NOSTRILS DAILY       Inhaled Steroids Protocol Passed - 12/6/2019  1:34 PM        Passed - Patient is age 12 or older        Passed - Recent (12 mo) or future (30 days) visit within the authorizing provider's specialty     Patient has had an office visit with the authorizing provider or a provider within the authorizing providers department within the previous 12 mos or has a future within next 30 days. See \"Patient Info\" tab in inbasket, or \"Choose Columns\" in Meds & Orders section of the refill encounter.              Passed - Medication is active on med list          "

## 2019-12-09 RX ORDER — FLUTICASONE PROPIONATE 50 MCG
SPRAY, SUSPENSION (ML) NASAL
Qty: 16 ML | Refills: 1 | Status: SHIPPED | OUTPATIENT
Start: 2019-12-09 | End: 2020-02-11

## 2019-12-09 NOTE — TELEPHONE ENCOUNTER
Prescription approved per [facility] Refill Protocol.      Peggy Greene RN

## 2020-02-11 ENCOUNTER — OFFICE VISIT (OUTPATIENT)
Dept: FAMILY MEDICINE | Facility: CLINIC | Age: 55
End: 2020-02-11
Payer: COMMERCIAL

## 2020-02-11 ENCOUNTER — HOSPITAL ENCOUNTER (INPATIENT)
Facility: CLINIC | Age: 55
LOS: 1 days | Discharge: HOME | DRG: 068 | End: 2020-02-12
Attending: EMERGENCY MEDICINE | Admitting: INTERNAL MEDICINE
Payer: COMMERCIAL

## 2020-02-11 ENCOUNTER — TELEPHONE (OUTPATIENT)
Dept: FAMILY MEDICINE | Facility: CLINIC | Age: 55
End: 2020-02-11

## 2020-02-11 ENCOUNTER — TRANSFERRED RECORDS (OUTPATIENT)
Dept: HEALTH INFORMATION MANAGEMENT | Facility: CLINIC | Age: 55
End: 2020-02-11

## 2020-02-11 VITALS
HEIGHT: 67 IN | DIASTOLIC BLOOD PRESSURE: 96 MMHG | OXYGEN SATURATION: 97 % | WEIGHT: 145 LBS | BODY MASS INDEX: 22.76 KG/M2 | HEART RATE: 95 BPM | SYSTOLIC BLOOD PRESSURE: 150 MMHG | RESPIRATION RATE: 18 BRPM | TEMPERATURE: 98.5 F

## 2020-02-11 DIAGNOSIS — G89.29 CHRONIC INTRACTABLE HEADACHE, UNSPECIFIED HEADACHE TYPE: Primary | ICD-10-CM

## 2020-02-11 DIAGNOSIS — I66.02 OCCLUSION OF LEFT MIDDLE CEREBRAL ARTERY: Primary | ICD-10-CM

## 2020-02-11 DIAGNOSIS — R51.9 CHRONIC INTRACTABLE HEADACHE, UNSPECIFIED HEADACHE TYPE: Primary | ICD-10-CM

## 2020-02-11 DIAGNOSIS — G89.29 CHRONIC INTRACTABLE HEADACHE, UNSPECIFIED HEADACHE TYPE: ICD-10-CM

## 2020-02-11 DIAGNOSIS — R51.9 CHRONIC INTRACTABLE HEADACHE, UNSPECIFIED HEADACHE TYPE: ICD-10-CM

## 2020-02-11 LAB
ALBUMIN SERPL-MCNC: 4.3 G/DL (ref 3.4–5)
ALP SERPL-CCNC: 73 U/L (ref 40–150)
ALT SERPL W P-5'-P-CCNC: 22 U/L (ref 0–50)
ANION GAP SERPL CALCULATED.3IONS-SCNC: 4 MMOL/L (ref 3–14)
ANION GAP SERPL CALCULATED.3IONS-SCNC: 7 MMOL/L (ref 3–14)
APTT PPP: 24 SEC (ref 22–37)
AST SERPL W P-5'-P-CCNC: 20 U/L (ref 0–45)
BASOPHILS # BLD AUTO: 0 10E9/L (ref 0–0.2)
BASOPHILS # BLD AUTO: 0.1 10E9/L (ref 0–0.2)
BASOPHILS NFR BLD AUTO: 0.8 %
BASOPHILS NFR BLD AUTO: 1 %
BILIRUB SERPL-MCNC: 0.4 MG/DL (ref 0.2–1.3)
BUN SERPL-MCNC: 16 MG/DL (ref 7–30)
BUN SERPL-MCNC: 18 MG/DL (ref 7–30)
CALCIUM SERPL-MCNC: 9.4 MG/DL (ref 8.5–10.1)
CALCIUM SERPL-MCNC: 9.7 MG/DL (ref 8.5–10.1)
CHLORIDE SERPL-SCNC: 106 MMOL/L (ref 94–109)
CHLORIDE SERPL-SCNC: 106 MMOL/L (ref 94–109)
CHOLEST SERPL-MCNC: 223 MG/DL
CO2 SERPL-SCNC: 26 MMOL/L (ref 20–32)
CO2 SERPL-SCNC: 29 MMOL/L (ref 20–32)
CREAT SERPL-MCNC: 0.62 MG/DL (ref 0.52–1.04)
CREAT SERPL-MCNC: 0.65 MG/DL (ref 0.52–1.04)
DIFFERENTIAL METHOD BLD: NORMAL
DIFFERENTIAL METHOD BLD: NORMAL
EOSINOPHIL # BLD AUTO: 0.1 10E9/L (ref 0–0.7)
EOSINOPHIL # BLD AUTO: 0.2 10E9/L (ref 0–0.7)
EOSINOPHIL NFR BLD AUTO: 2 %
EOSINOPHIL NFR BLD AUTO: 3.3 %
ERYTHROCYTE [DISTWIDTH] IN BLOOD BY AUTOMATED COUNT: 12.7 % (ref 10–15)
ERYTHROCYTE [DISTWIDTH] IN BLOOD BY AUTOMATED COUNT: 12.8 % (ref 10–15)
ERYTHROCYTE [SEDIMENTATION RATE] IN BLOOD BY WESTERGREN METHOD: 9 MM/H (ref 0–30)
GFR SERPL CREATININE-BSD FRML MDRD: >90 ML/MIN/{1.73_M2}
GFR SERPL CREATININE-BSD FRML MDRD: >90 ML/MIN/{1.73_M2}
GLUCOSE SERPL-MCNC: 102 MG/DL (ref 70–99)
GLUCOSE SERPL-MCNC: 94 MG/DL (ref 70–99)
HBA1C MFR BLD: 5.5 % (ref 0–5.6)
HCT VFR BLD AUTO: 37.7 % (ref 35–47)
HCT VFR BLD AUTO: 39.1 % (ref 35–47)
HDLC SERPL-MCNC: 115 MG/DL
HGB BLD-MCNC: 12.7 G/DL (ref 11.7–15.7)
HGB BLD-MCNC: 13.3 G/DL (ref 11.7–15.7)
IMM GRANULOCYTES # BLD: 0 10E9/L (ref 0–0.4)
IMM GRANULOCYTES NFR BLD: 0 %
INR PPP: 0.98 (ref 0.86–1.14)
LDLC SERPL CALC-MCNC: 96 MG/DL
LYMPHOCYTES # BLD AUTO: 1 10E9/L (ref 0.8–5.3)
LYMPHOCYTES # BLD AUTO: 1.3 10E9/L (ref 0.8–5.3)
LYMPHOCYTES NFR BLD AUTO: 20.6 %
LYMPHOCYTES NFR BLD AUTO: 25.8 %
MCH RBC QN AUTO: 32.2 PG (ref 26.5–33)
MCH RBC QN AUTO: 32.4 PG (ref 26.5–33)
MCHC RBC AUTO-ENTMCNC: 33.7 G/DL (ref 31.5–36.5)
MCHC RBC AUTO-ENTMCNC: 34 G/DL (ref 31.5–36.5)
MCV RBC AUTO: 95 FL (ref 78–100)
MCV RBC AUTO: 95 FL (ref 78–100)
MONOCYTES # BLD AUTO: 0.4 10E9/L (ref 0–1.3)
MONOCYTES # BLD AUTO: 0.6 10E9/L (ref 0–1.3)
MONOCYTES NFR BLD AUTO: 11.6 %
MONOCYTES NFR BLD AUTO: 8.7 %
NEUTROPHILS # BLD AUTO: 3.1 10E9/L (ref 1.6–8.3)
NEUTROPHILS # BLD AUTO: 3.2 10E9/L (ref 1.6–8.3)
NEUTROPHILS NFR BLD AUTO: 62.7 %
NEUTROPHILS NFR BLD AUTO: 63.5 %
NONHDLC SERPL-MCNC: 108 MG/DL
NRBC # BLD AUTO: 0 10*3/UL
NRBC BLD AUTO-RTO: 0 /100
PLATELET # BLD AUTO: 197 10E9/L (ref 150–450)
PLATELET # BLD AUTO: 209 10E9/L (ref 150–450)
POTASSIUM SERPL-SCNC: 3.9 MMOL/L (ref 3.4–5.3)
POTASSIUM SERPL-SCNC: 4.2 MMOL/L (ref 3.4–5.3)
PROT SERPL-MCNC: 7.3 G/DL (ref 6.8–8.8)
RBC # BLD AUTO: 3.95 10E12/L (ref 3.8–5.2)
RBC # BLD AUTO: 4.11 10E12/L (ref 3.8–5.2)
SODIUM SERPL-SCNC: 139 MMOL/L (ref 133–144)
SODIUM SERPL-SCNC: 139 MMOL/L (ref 133–144)
TRIGL SERPL-MCNC: 62 MG/DL
TROPONIN I SERPL-MCNC: <0.015 UG/L (ref 0–0.04)
TSH SERPL DL<=0.005 MIU/L-ACNC: 2.51 MU/L (ref 0.4–4)
WBC # BLD AUTO: 4.8 10E9/L (ref 4–11)
WBC # BLD AUTO: 5 10E9/L (ref 4–11)

## 2020-02-11 PROCEDURE — 84484 ASSAY OF TROPONIN QUANT: CPT | Performed by: INTERNAL MEDICINE

## 2020-02-11 PROCEDURE — 85730 THROMBOPLASTIN TIME PARTIAL: CPT | Performed by: EMERGENCY MEDICINE

## 2020-02-11 PROCEDURE — 36415 COLL VENOUS BLD VENIPUNCTURE: CPT | Performed by: PHYSICIAN ASSISTANT

## 2020-02-11 PROCEDURE — 96375 TX/PRO/DX INJ NEW DRUG ADDON: CPT

## 2020-02-11 PROCEDURE — 99285 EMERGENCY DEPT VISIT HI MDM: CPT | Mod: 25

## 2020-02-11 PROCEDURE — 80050 GENERAL HEALTH PANEL: CPT | Performed by: PHYSICIAN ASSISTANT

## 2020-02-11 PROCEDURE — 85025 COMPLETE CBC W/AUTO DIFF WBC: CPT | Performed by: EMERGENCY MEDICINE

## 2020-02-11 PROCEDURE — 80048 BASIC METABOLIC PNL TOTAL CA: CPT | Performed by: PHYSICIAN ASSISTANT

## 2020-02-11 PROCEDURE — 83036 HEMOGLOBIN GLYCOSYLATED A1C: CPT | Performed by: INTERNAL MEDICINE

## 2020-02-11 PROCEDURE — 80061 LIPID PANEL: CPT | Performed by: INTERNAL MEDICINE

## 2020-02-11 PROCEDURE — 96374 THER/PROPH/DIAG INJ IV PUSH: CPT

## 2020-02-11 PROCEDURE — 25000128 H RX IP 250 OP 636: Performed by: EMERGENCY MEDICINE

## 2020-02-11 PROCEDURE — 25000132 ZZH RX MED GY IP 250 OP 250 PS 637: Performed by: INTERNAL MEDICINE

## 2020-02-11 PROCEDURE — 99223 1ST HOSP IP/OBS HIGH 75: CPT | Mod: AI | Performed by: INTERNAL MEDICINE

## 2020-02-11 PROCEDURE — 85610 PROTHROMBIN TIME: CPT | Performed by: EMERGENCY MEDICINE

## 2020-02-11 PROCEDURE — 36415 COLL VENOUS BLD VENIPUNCTURE: CPT | Performed by: INTERNAL MEDICINE

## 2020-02-11 PROCEDURE — 99215 OFFICE O/P EST HI 40 MIN: CPT | Performed by: PHYSICIAN ASSISTANT

## 2020-02-11 PROCEDURE — 25800030 ZZH RX IP 258 OP 636: Performed by: EMERGENCY MEDICINE

## 2020-02-11 PROCEDURE — 80053 COMPREHEN METABOLIC PANEL: CPT | Performed by: EMERGENCY MEDICINE

## 2020-02-11 PROCEDURE — 96361 HYDRATE IV INFUSION ADD-ON: CPT

## 2020-02-11 PROCEDURE — 99207 ZZC CDG-MDM COMPONENT: MEETS MODERATE - UP CODED: CPT | Performed by: INTERNAL MEDICINE

## 2020-02-11 PROCEDURE — 85652 RBC SED RATE AUTOMATED: CPT | Performed by: PHYSICIAN ASSISTANT

## 2020-02-11 RX ORDER — LABETALOL HYDROCHLORIDE 5 MG/ML
10-40 INJECTION, SOLUTION INTRAVENOUS EVERY 10 MIN PRN
Status: DISCONTINUED | OUTPATIENT
Start: 2020-02-11 | End: 2020-02-12 | Stop reason: HOSPADM

## 2020-02-11 RX ORDER — BIOTIN 10 MG
2 TABLET ORAL DAILY
COMMUNITY

## 2020-02-11 RX ORDER — VENLAFAXINE HYDROCHLORIDE 150 MG/1
150 CAPSULE, EXTENDED RELEASE ORAL DAILY
Status: DISCONTINUED | OUTPATIENT
Start: 2020-02-12 | End: 2020-02-12 | Stop reason: HOSPADM

## 2020-02-11 RX ORDER — KETOROLAC TROMETHAMINE 15 MG/ML
15 INJECTION, SOLUTION INTRAMUSCULAR; INTRAVENOUS ONCE
Status: COMPLETED | OUTPATIENT
Start: 2020-02-11 | End: 2020-02-11

## 2020-02-11 RX ORDER — DEXAMETHASONE SODIUM PHOSPHATE 10 MG/ML
10 INJECTION, SOLUTION INTRAMUSCULAR; INTRAVENOUS ONCE
Status: COMPLETED | OUTPATIENT
Start: 2020-02-11 | End: 2020-02-11

## 2020-02-11 RX ORDER — METOCLOPRAMIDE HYDROCHLORIDE 5 MG/ML
10 INJECTION INTRAMUSCULAR; INTRAVENOUS ONCE
Status: COMPLETED | OUTPATIENT
Start: 2020-02-11 | End: 2020-02-11

## 2020-02-11 RX ORDER — ASPIRIN 300 MG/1
300 SUPPOSITORY RECTAL DAILY
Status: DISCONTINUED | OUTPATIENT
Start: 2020-02-11 | End: 2020-02-12 | Stop reason: HOSPADM

## 2020-02-11 RX ADMIN — METOCLOPRAMIDE 10 MG: 5 INJECTION, SOLUTION INTRAMUSCULAR; INTRAVENOUS at 17:00

## 2020-02-11 RX ADMIN — SODIUM CHLORIDE 1000 ML: 9 INJECTION, SOLUTION INTRAVENOUS at 17:03

## 2020-02-11 RX ADMIN — KETOROLAC TROMETHAMINE 15 MG: 15 INJECTION, SOLUTION INTRAMUSCULAR; INTRAVENOUS at 17:00

## 2020-02-11 RX ADMIN — ASPIRIN 325 MG: 325 TABLET, COATED ORAL at 20:48

## 2020-02-11 RX ADMIN — DEXAMETHASONE SODIUM PHOSPHATE 10 MG: 10 INJECTION, SOLUTION INTRAMUSCULAR; INTRAVENOUS at 17:00

## 2020-02-11 ASSESSMENT — ENCOUNTER SYMPTOMS
SPEECH DIFFICULTY: 1
NUMBNESS: 0
CONFUSION: 1
HEADACHES: 1
SLEEP DISTURBANCE: 0

## 2020-02-11 ASSESSMENT — MIFFLIN-ST. JEOR
SCORE: 1280.35
SCORE: 1280.35

## 2020-02-11 ASSESSMENT — PAIN SCALES - GENERAL: PAINLEVEL: NO PAIN (0)

## 2020-02-11 NOTE — H&P
[facility]    History and Physical  Hospitalist       Date of Admission:  2/11/2020  Date of Service (when I saw the patient): 02/11/20    Assessment & Plan   Palmira Hunt is a 54 year old female who presents with headache    L MCA occlusion  Headache  Presents after MRI/A done for evaluation of headaches. Notes visual disturbance, also more forgetful. Pain in occipital area, worse with sneezing/ coughing/ bending over. 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 No known h/o hypertension  - permissive hypertension  - prn labetalol Systolic>220     Depression  Continue venlafaxine    DVT Prophylaxis: Ambulate every shift  Code Status: Full Code    Disposition: Expected discharge in 1-2 days pending neurology/ CVA evaluation    Robert Gutierrez MD  [phone] (P)  Text Page     Primary Care Physician   [facility]    Chief Complaint   headache    History is obtained from the patient and medical records    History of Present Illness   Palmira Hunt is a 54 year old female who presents with headache. She has a past medical history remarkable for depression and mild hyperlipidemia.  She notes that 3 weeks ago she developed a headache in her occipital region which is been there constantly since.  She notes the pain does wax and wane and is worse with coughing, sneezing or bending down.  She notes as well that she appears to be more farsighted as well as having memory it does not as sharp.  She has no trauma to the back of her head or any part of her head.  She is been taking  Advil every morning for her headache.  She denies any double vision or visual deficit.  Denies any dizziness.  No fevers or chills.  No chest pain or palpitations.  No shortness of breath.  Denies any dysphasia, weakness or numbness and tingling.      Past Medical History    I have reviewed this patient's medical history and updated it with pertinent information if needed.   Past Medical History:   Diagnosis Date     Anorexia nervosa      Dysplasia of cervix, unspecified      Generalized anxiety disorder      Herpes simplex without mention of complication     nose     Human papillomavirus in conditions classified elsewhere and of unspecified site      Mild recurrent major depression (H)        Past Surgical History   I have reviewed this patient's surgical history and updated it with pertinent information if needed.  Past Surgical History:   Procedure Laterality Date     COLONOSCOPY N/A 7/20/2016    Procedure: COMBINED COLONOSCOPY, SINGLE OR MULTIPLE BIOPSY/POLYPECTOMY BY BIOPSY;  Surgeon: Duane, William Charles, MD;  Location: [facility] OR     COLONOSCOPY WITH CO2 INSUFFLATION N/A 7/20/2016    Procedure: COLONOSCOPY WITH CO2 INSUFFLATION;  Surgeon: Duane, William Charles, MD;  Location: [facility] OR     D & C       HYSTERECTOMY, VAGINAL  2002    for cervical dysplasia     MOUTH SURGERY      infected tooth, hospitalized for 8 days in 2/2017     SLING TRANSVAGINAL  6/27/2013    Procedure: SLING TRANSVAGINAL;  Cysto with TVT;  Surgeon: Michael Nicole MD;  Location: [facility] OR     TONSILLECTOMY & ADENOIDECTOMY         Prior to Admission Medications   Prior to Admission Medications   Prescriptions Last Dose Informant Patient Reported? Taking?   Multiple Vitamins-Minerals (MULTIVITAMIN ADULT) CHEW 2/11/2020 at am  Yes Yes   Sig: Take 2 chew tab by mouth daily   calcium carbonate-vitamin D (OS-LUIS) 500-400 MG-UNIT tablet 2/11/2020 at am  Yes Yes   Sig: Take 1 tablet by mouth daily   psyllium (METAMUCIL/KONSYL) capsule 2/11/2020 at am   Yes Yes   Sig: Take 1 capsule by mouth daily   venlafaxine (EFFEXOR-XR) 150 MG 24 hr capsule 2/11/2020 at am  No Yes   Sig: Take 1 capsule (150 mg) by mouth daily      Facility-Administered Medications: None     Allergies   Allergies   Allergen Reactions     Penicillins Hives       Social History   I have reviewed this patient's social history and updated it with pertinent information if needed. Palmira Hunt  reports that she has never smoked. She has never used smokeless tobacco. She reports current alcohol use. She reports that she does not use drugs.    Family History   I have reviewed this patient's family history and updated it with pertinent information if needed.   Family History   Problem Relation Age of Onset     Cancer Mother         lung-smoker     Depression Mother      Hypertension Mother      Unknown/Adopted Father      Asthma No family hx of      C.A.D. No family hx of      Diabetes No family hx of      Cerebrovascular Disease No family hx of      Breast Cancer No family hx of      Cancer - colorectal No family hx of      Prostate Cancer No family hx of      Colon Cancer No family hx of        Review of Systems   The 10 point Review of Systems is negative other than noted in the HPI or here.     Physical Exam   Temp: 98  F (36.7  C) Temp src: Oral BP: (!) 167/102 Pulse: 90 Heart Rate: 96 Resp: 12 SpO2: 97 % O2 Device: None (Room air)    Vital Signs with Ranges  145 lbs 0 oz    Constitutional: alert, oriented and in no acute distress  Eyes: EOMI, PERRL  HEENT: OP clear  Respiratory: CTA B without w/c  Cardiovascular: RRR without m/r/g  GI: soft, nontender, nondistended, no HSM  Lymph/Hematologic: no cervical LAD  Genitourinary: deferred  Skin: no rashes or lesions grossly  Musculoskeletal: no deformities or arthritis  Neurologic: CN II-XII, HERNÁNDEZ, sensation grossly intact  Psychiatric: mood and affect wnl    Data   Data reviewed today:  I personally reviewed the brain MRI image(s) showing  occluded MCA, no evidence of  CVA.  Recent Labs   Lab 02/11/20  1655 02/11/20  0854   WBC 5.0 4.8   HGB 12.7 13.3   MCV 95 95    197   INR 0.98  --     139   POTASSIUM 3.9 4.2   CHLORIDE 106 106   CO2 29 26   BUN 16 18   CR 0.62 0.65   ANIONGAP 4 7   LUIS 9.4 9.7   GLC 94 102*   ALBUMIN 4.3  --    PROTTOTAL 7.3  --    BILITOTAL 0.4  --    ALKPHOS 73  --    ALT 22  --    AST 20  --        No results found for this or any previous visit (from the past 24 hour(s)).

## 2020-02-11 NOTE — RESULT ENCOUNTER NOTE
Raheem Chavis  Your electrolytes, blood sugar and kidney function were normal.    Your thyroid function was normal.   Your inflammatory marker was normal.   Your blood counts and hemoglobin were normal.   Please call or MyChart my office with any questions or concerns.    Hortensia Ricks, PAC

## 2020-02-11 NOTE — PROGRESS NOTES
Subjective     Palmira Hunt is a 54 year old female who presents to clinic today for the following health issues:    HPI   Headache  Onset: 3 weeks ago    Description:   Location: back of head   Character: dull ache  Frequency:  Every day  Duration:  all day    Intensity: moderate    Progression of Symptoms:  same    Accompanying Signs & Symptoms:  Stiff neck: no  Neck or upper back pain: no  Fever: no  Sinus pressure: no  Nausea or vomiting: no  Dizziness: no  Numbness: no  Weakness: no  Visual changes: YES-     History:   Head trauma: no  Family history of migraines: no  Previous tests for headaches: no  Neurologist evaluations: no  Able to do daily activities: no  Wake with a headaches: YES  Do headaches wake you up: no  Daily pain medication use: YES- advil  Work/school stressors/changes: no    Precipitating factors:   Does light make it worse: no  Does sound make it worse: no    Alleviating factors:  Does sleep help: no    Therapies Tried and outcome: Ibuprofen (Advil, Motrin)    Bend over- like to sneeze or cough severe pain  No sinus pain or pressure. No family  History of headache like migraine or aneurysm  Harder for me to see. No fever, sweats, chills   Closer images more difficult to see - has had eye exama and normal   Pain is Not keeping up with sleep- but moving hurts  No history of headache.   No numbness or tingling. No history of trauma or injury   Rates headache approximately 5/10.     [facility]- due for mammogram    Patient Active Problem List   Diagnosis     Herpes simplex virus (HSV) infection     CARDIOVASCULAR SCREENING; LDL GOAL LESS THAN 160     Papanicolaou smear of cervix with low grade squamous intraepithelial lesion (LGSIL)     Generalized anxiety disorder     Menopausal symptoms     Major depression in complete remission (H)     Past Surgical History:   Procedure Laterality Date     COLONOSCOPY N/A 7/20/2016    Procedure: COMBINED COLONOSCOPY, SINGLE  OR MULTIPLE BIOPSY/POLYPECTOMY BY BIOPSY;  Surgeon: Duane, William Charles, MD;  Location: [facility] OR     COLONOSCOPY WITH CO2 INSUFFLATION N/A 7/20/2016    Procedure: COLONOSCOPY WITH CO2 INSUFFLATION;  Surgeon: Duane, William Charles, MD;  Location: [facility] OR     D & C       HYSTERECTOMY, VAGINAL  2002    for cervical dysplasia     MOUTH SURGERY      infected tooth, hospitalized for 8 days in 2/2017     SLING TRANSVAGINAL  6/27/2013    Procedure: SLING TRANSVAGINAL;  Cysto with TVT;  Surgeon: Michael Nicole MD;  Location: [facility] OR     TONSILLECTOMY & ADENOIDECTOMY         Social History     Tobacco Use     Smoking status: Never Smoker     Smokeless tobacco: Never Used   Substance Use Topics     Alcohol use: Yes     Comment: occasional, 3 drinks a week     Family History   Problem Relation Age of Onset     Cancer Mother         lung-smoker     Depression Mother      Hypertension Mother      Unknown/Adopted Father      Asthma No family hx of      C.A.D. No family hx of      Diabetes No family hx of      Cerebrovascular Disease No family hx of      Breast Cancer No family hx of      Cancer - colorectal No family hx of      Prostate Cancer No family hx of      Colon Cancer No family hx of          Current Outpatient Medications   Medication Sig Dispense Refill     venlafaxine (EFFEXOR-XR) 150 MG 24 hr capsule Take 1 capsule (150 mg) by mouth daily 90 capsule 0     BP Readings from Last 3 Encounters:   02/11/20 (!) 150/96   11/14/19 139/84   07/08/19 117/75    Wt Readings from Last 3 Encounters:   02/11/20 65.8 kg (145 lb)   11/14/19 64.4 kg (142 lb)   07/08/19 65.3 kg (144 lb)                      Reviewed and updated as needed this visit by Provider  Tobacco  Allergies  Meds  Problems  Med Hx  Surg Hx  Fam Hx  Soc Hx          Review of Systems   ROS COMP: Constitutional, HEENT, cardiovascular, pulmonary, gi and gu systems are negative, except as otherwise noted.      Objective    BP (!) 150/96 (BP Location: Right  "arm, Patient Position: Sitting, Cuff Size: Adult Regular)   Pulse 95   Temp 98.5  F (36.9  C) (Oral)   Resp 18   Ht 1.702 m (5' 7\")   Wt 65.8 kg (145 lb)   SpO2 97%   BMI 22.71 kg/m    Body mass index is 22.71 kg/m .  Physical Exam   GENERAL: healthy, alert and no distress  EYES: Eyes grossly normal to inspection, PERRL and conjunctivae and sclerae normal  HENT: ear canals and TM's normal, nose and mouth without ulcers or lesions  NECK: no adenopathy, no asymmetry, masses, or scars and thyroid normal to palpation  RESP: lungs clear to auscultation - no rales, rhonchi or wheezes  CV: regular rate and rhythm, normal S1 S2, no S3 or S4, no murmur, click or rub, no peripheral edema and peripheral pulses strong  No carotid bruit   MS: no gross musculoskeletal defects noted, no edema  NEURO: Normal strength and tone, sensory exam grossly normal, mentation intact, cranial nerves 2-12 intact, DTR's normal and symmetric bilaterally , gait normal including heel/toe/tandem walking, Romberg normal and rapid alternating movements normal  Patient is tearful and a bit anxious     Diagnostic Test Results:  Results for orders placed or performed in visit on 02/11/20   CBC with platelets differential     Status: None   Result Value Ref Range    WBC 4.8 4.0 - 11.0 10e9/L    RBC Count 4.11 3.8 - 5.2 10e12/L    Hemoglobin 13.3 11.7 - 15.7 g/dL    Hematocrit 39.1 35.0 - 47.0 %    MCV 95 78 - 100 fl    MCH 32.4 26.5 - 33.0 pg    MCHC 34.0 31.5 - 36.5 g/dL    RDW 12.7 10.0 - 15.0 %    Platelet Count 197 150 - 450 10e9/L    % Neutrophils 63.5 %    % Lymphocytes 20.6 %    % Monocytes 11.6 %    % Eosinophils 3.3 %    % Basophils 1.0 %    Absolute Neutrophil 3.1 1.6 - 8.3 10e9/L    Absolute Lymphocytes 1.0 0.8 - 5.3 10e9/L    Absolute Monocytes 0.6 0.0 - 1.3 10e9/L    Absolute Eosinophils 0.2 0.0 - 0.7 10e9/L    Absolute Basophils 0.1 0.0 - 0.2 10e9/L    Diff Method Automated Method    Basic metabolic panel     Status: Abnormal   Result " Value Ref Range    Sodium 139 133 - 144 mmol/L    Potassium 4.2 3.4 - 5.3 mmol/L    Chloride 106 94 - 109 mmol/L    Carbon Dioxide 26 20 - 32 mmol/L    Anion Gap 7 3 - 14 mmol/L    Glucose 102 (H) 70 - 99 mg/dL    Urea Nitrogen 18 7 - 30 mg/dL    Creatinine 0.65 0.52 - 1.04 mg/dL    GFR Estimate >90 >60 mL/min/[1.73_m2]    GFR Estimate If Black >90 >60 mL/min/[1.73_m2]    Calcium 9.7 8.5 - 10.1 mg/dL   ESR: Erythrocyte sedimentation rate     Status: None   Result Value Ref Range    Sed Rate 9 0 - 30 mm/h   TSH with free T4 reflex     Status: None   Result Value Ref Range    TSH 2.51 0.40 - 4.00 mU/L      MRI and MRA performed at [facility] and I was called by radiologist   no flow middle cerebral artery- has collateral flow and no signs of infarct but dura is moderately enhanced        Assessment & Plan     1. Chronic intractable headache, unspecified headache type  Normal neuro exam. Is a bit hypertensive and no history of headache - no improvement with ibuprofen  MRI and MRA were done today at [facility]  Patient was notified by phone and driven by  to [facility] emergency department   No anemia. Normal blood counts  Normal thyroid function and inflammatory marker  I called [facility] EMERGENCY DEPARTMENT and advised that patient would be arriving by private car   - CBC with platelets differential  - Basic metabolic panel  - MR Brain w/o & w Contrast; Future  - MRA Brain ([facility] [place]) wo Contrast; Future  - ESR: Erythrocyte sedimentation rate  - TSH with free T4 reflex       Patient Instructions   I will notify you of lab results via "[employer]hart   Schedule MRI and MRA at [facility] ([phone]) today or tomorrow.   Return urgently if any change in symptoms.              Hortensia Ricks PA-C  [facility]

## 2020-02-11 NOTE — ED PROVIDER NOTES
"  History     Chief Complaint:  Headache    The history is provided by the patient.      Palmira Hunt is a 54 year old female who presents with headache. Patient remarks on three weeks of constant posterior occipital headache and details her pain has been intractable despite Advil. She attest to visual disturbances especially with close images but reports an otherwise normal visual acuity exam within the past year. However, she does note her visual disturbance seem to be worsening. Palmira also states she has been \"immediately\" forgetful. This morning, patient states she forgot about her coffee after doing laundry and asserts she has been catching herself \"stuttering\" more so than baseline. Palmira comments her pain has been exacerbated with coughing, sneezing, and bending over as well. Notably, patient obtained MRI/MRA today with [facility] and received a phone call from them to present to the ED, results are outlined below. She denies any trauma, numbness, sleep disturbance, recent long travel, or history of clotting disorder and PE/DVT.     MR brain wwo Contrast 2/11/20:  1. There is mild increased enhancement of the dura which is nonspecific. The left MCA shows no flow on the correlating MRA performed at the same imaging session. This dural enhancement is most likely due to collateral circulation from dural-cortical arterial anastomoses.  2. No evidence of infraction, mass, or hemorrhage.   3. No other acute findings.   4. Minimal small vessel disease.    MR angio [place] of ward w/o contrast 2/11/20:  Complete occlusion of the left middle cerebral artery.     Allergies:  Penicillins     Medications:    Effexor      Past Medical History:    Anorexia nervosa  Depression  Herpes simplex  CIERRA  HPV in conditions classified elsewhere and of unspecified site    Past Surgical History:    D&C  Vaginal hysterectomy  Mouth surgery  Transvaginal sling  T&A    Family History:    Lung " "cancer  Depression  HTN    Social History:  Accompanied by her .  Never Smoker  Alcohol Use: Positive  Marital Status:      Review of Systems   Eyes: Positive for visual disturbance.   Neurological: Positive for speech difficulty and headaches. Negative for numbness.   Psychiatric/Behavioral: Positive for confusion (forgetful). Negative for sleep disturbance.   All other systems reviewed and are negative.    Physical Exam   Patient Vitals for the past 24 hrs:   BP Temp Temp src Pulse Heart Rate Resp SpO2 Height Weight   02/11/20 1700 (!) 167/102 -- -- 90 96 12 97 % -- --   02/11/20 1630 (!) 156/109 -- -- 101 -- -- 99 % -- --   02/11/20 1616 (!) 169/92 98  F (36.7  C) Oral -- 100 20 100 % 1.702 m (5' 7\") 65.8 kg (145 lb)     Physical Exam  General/Appearance: appears stated age, well-groomed, appears comfortable  Eyes: EOMI, no scleral injection, no icterus  ENT: MMM  Neck: supple, nl ROM, no stiffness  Cardiovascular: tachy but regular, nl S1S2, no m/r/g, 2+ pulses in all 4 extremities, cap refill <2sec  Respiratory: CTAB, good air movement throughout, no wheezes/rhonchi/rales, no increased WOB, no retractions  Back: no lesions  GI: abd soft, non-distended, nttp,  no HSM, no rebound, no guarding, nl BS  MSK: HERNÁNDEZ, good tone, no bony abnormality  Skin: warm and well-perfused, no rash, no edema, no ecchymosis, nl turgor  Neuro: GCS 15, alert and oriented, no gross focal neuro deficits  Psych: interacts appropriately  Heme: no petechia, no purpura, no active bleeding    Emergency Department Course     Laboratory:  CBC: WBC 5.0, HGB 12.7,   CMP: WNL (Creatinine 0.62)    INR: 0.98  PTT: 24    Interventions:  1700: Toradol 15 mg IV  1700: Decadron 10 mg IV  1700: Benadryl 12.5 mg IV   1703: NS 1L IV Bolus     Emergency Department Course:  Nursing notes and vitals reviewed.  1628 I performed an exam of the patient as documented above.   1645 I spoke with Dr. Olvera of Neuro Critical Care regarding " patient's presentation, findings, and plan of care.  1647 Updated on plan of care. Findings and plan explained to the patient who consents to admission.   1655 IV was inserted and blood was drawn for laboratory testing, results above.  1748 Discussed the patient with Dr. Gutierrez, hospitalist, who will admit the patient for further monitoring, evaluation, and treatment.     Impression & Plan      Medical Decision Making:  This patient is a 54-year-old female, fairly healthy, who presents after MRI/MRA done as an outpatient which showed completely occluded left MCA and completely occluded left peripheral branches.  Is unclear how related this is to her headaches.  She otherwise is neurologically intact.  I spoke with the on-call stroke neurologist who is asked that she be admitted.  So far labs in the ED have been unremarkable and she is neurologically stable.    Diagnosis:    ICD-10-CM   1. Chronic intractable headache, unspecified headache type R51     Disposition: Admitted to Neuro-Tele    Scribe Disclosure:   I, Andrei Nielson, am serving as a scribe at 4:39 PM on 2/11/2020 to document services personally performed by Dominga Graves MD based on my observations and the provider's statements to me.      EMERGENCY DEPARTMENT       Dominga Graves MD  02/11/20 5170

## 2020-02-11 NOTE — PATIENT INSTRUCTIONS
I will notify you of lab results via Mychart   Schedule MRI and MRA at [facility] ([phone]) today or tomorrow.   Return urgently if any change in symptoms.

## 2020-02-11 NOTE — PHARMACY-ADMISSION MEDICATION HISTORY
Pharmacy Medication History  Admission medication history interview status for the 2/11/2020  admission is complete. See EPIC admission navigator for prior to admission medications     Medication history sources: Patient  Medication history source reliability: Good  Adherence assessment: Good    Significant changes made to the medication list:  Added: multivitamin, calcium, metamucil      Additional medication history information:   none    Medication reconciliation completed by provider prior to medication history? No    Time spent in this activity: 10 minutes      Prior to Admission medications    Medication Sig Last Dose Taking? Auth Provider   calcium carbonate-vitamin D (OS-LUIS) 500-400 MG-UNIT tablet Take 1 tablet by mouth daily 2/11/2020 at am Yes Unknown, Entered By History   Multiple Vitamins-Minerals (MULTIVITAMIN ADULT) CHEW Take 2 chew tab by mouth daily 2/11/2020 at am Yes Unknown, Entered By History   psyllium (METAMUCIL/KONSYL) capsule Take 1 capsule by mouth daily 2/11/2020 at am Yes Unknown, Entered By History   venlafaxine (EFFEXOR-XR) 150 MG 24 hr capsule Take 1 capsule (150 mg) by mouth daily 2/11/2020 at am Yes Inés Jaffe MD Chelsea Mayer, PharmD  Inpatient Clinical Pharmacist  [phone]

## 2020-02-11 NOTE — ED TRIAGE NOTES
Patient reports here for a blocked MCA. Complains of headache, forgetfulness and stuttering. States symptoms started 3 weeks ago. Had MRI at 1330 today.

## 2020-02-11 NOTE — ED NOTES
"[facility]  ED Nurse Handoff Report    ED Chief complaint: Headache      ED Diagnosis:   Final diagnoses:   Chronic intractable headache, unspecified headache type       Code Status: to be discussed with admitting hospitalist    Allergies:   Allergies   Allergen Reactions     Penicillins Hives       Patient Story: Pt had been c/o headache for 2 weeks. Went in for MRI of head today at outside imaging center which saw abnormality on MRI and advised pt to head to ED with  driving. Pt A&O without any neuro deficits on assessment. Pt does state that there have been some occasional short term memory problems at home in recent days.   Focused Assessment:  A& Ox4, neuro intact    Treatments and/or interventions provided: IVF, pain management for headache  Patient's response to treatments and/or interventions: tolerating    To be done/followed up on inpatient unit:  NA    Does this patient have any cognitive concerns?: Short term memory loss per patient statement, undetected by RN    Activity level - Baseline/Home:  Independent  Activity Level - Current:   Independent    Patient's Preferred language: English   Needed?: No    Isolation: None  Infection: Not Applicable  Bariatric?: No    Vital Signs:   Vitals:    02/11/20 1616   BP: (!) 169/92   Resp: 20   Temp: 98  F (36.7  C)   TempSrc: Oral   SpO2: 100%   Weight: 65.8 kg (145 lb)   Height: 1.702 m (5' 7\")       Cardiac Rhythm:     Was the PSS-3 completed:   Yes  What interventions are required if any?               Family Comments:  at bedside, asking appropriate questions.  OBS brochure/video discussed/provided to patient/family: N/A              Name of person given brochure if not patient: NA              Relationship to patient: NA    For the majority of the shift this patient's behavior was Green.   Behavioral interventions performed were NA.    ED NURSE PHONE NUMBER: [phone]         "

## 2020-02-11 NOTE — TELEPHONE ENCOUNTER
Reason for Call:  Other     Detailed comments: an MRI was ordered for patient and the patient wants the orders to go to  in [facility], fax is [phone] and has an appointment today at 1:30pm today.    Phone Number Patient can be reached at: Other phone number:  [phone] (M)    Best Time: any    Can we leave a detailed message on this number? YES    Call taken on 2/11/2020 at 10:36 AM by Berenice Quintana

## 2020-02-12 ENCOUNTER — APPOINTMENT (OUTPATIENT)
Dept: CARDIOLOGY | Facility: CLINIC | Age: 55
DRG: 068 | End: 2020-02-12
Attending: INTERNAL MEDICINE
Payer: COMMERCIAL

## 2020-02-12 ENCOUNTER — APPOINTMENT (OUTPATIENT)
Dept: CT IMAGING | Facility: CLINIC | Age: 55
DRG: 068 | End: 2020-02-12
Attending: PHYSICIAN ASSISTANT
Payer: COMMERCIAL

## 2020-02-12 ENCOUNTER — APPOINTMENT (OUTPATIENT)
Dept: OCCUPATIONAL THERAPY | Facility: CLINIC | Age: 55
DRG: 068 | End: 2020-02-12
Attending: INTERNAL MEDICINE
Payer: COMMERCIAL

## 2020-02-12 VITALS
HEIGHT: 67 IN | OXYGEN SATURATION: 96 % | HEART RATE: 79 BPM | RESPIRATION RATE: 16 BRPM | WEIGHT: 145 LBS | DIASTOLIC BLOOD PRESSURE: 96 MMHG | TEMPERATURE: 97.9 F | BODY MASS INDEX: 22.76 KG/M2 | SYSTOLIC BLOOD PRESSURE: 145 MMHG

## 2020-02-12 PROBLEM — I63.512 ACUTE ISCHEMIC LEFT MCA STROKE (H): Status: ACTIVE | Noted: 2020-02-12

## 2020-02-12 LAB — TROPONIN I SERPL-MCNC: <0.015 UG/L (ref 0–0.04)

## 2020-02-12 PROCEDURE — 40000895 ZZH STATISTIC SLP IP EVAL DEFER: Performed by: SPEECH-LANGUAGE PATHOLOGIST

## 2020-02-12 PROCEDURE — 70496 CT ANGIOGRAPHY HEAD: CPT

## 2020-02-12 PROCEDURE — 70450 CT HEAD/BRAIN W/O DYE: CPT

## 2020-02-12 PROCEDURE — 84484 ASSAY OF TROPONIN QUANT: CPT | Performed by: INTERNAL MEDICINE

## 2020-02-12 PROCEDURE — 12000000 ZZH R&B MED SURG/OB

## 2020-02-12 PROCEDURE — 99239 HOSP IP/OBS DSCHRG MGMT >30: CPT | Performed by: PHYSICIAN ASSISTANT

## 2020-02-12 PROCEDURE — 25000132 ZZH RX MED GY IP 250 OP 250 PS 637: Performed by: INTERNAL MEDICINE

## 2020-02-12 PROCEDURE — 97165 OT EVAL LOW COMPLEX 30 MIN: CPT | Mod: GO | Performed by: OCCUPATIONAL THERAPIST

## 2020-02-12 PROCEDURE — 36415 COLL VENOUS BLD VENIPUNCTURE: CPT | Performed by: INTERNAL MEDICINE

## 2020-02-12 PROCEDURE — 25000128 H RX IP 250 OP 636: Performed by: INTERNAL MEDICINE

## 2020-02-12 PROCEDURE — 93306 TTE W/DOPPLER COMPLETE: CPT | Mod: 26 | Performed by: INTERNAL MEDICINE

## 2020-02-12 PROCEDURE — 40000264 ECHOCARDIOGRAM COMPLETE

## 2020-02-12 PROCEDURE — 0042T CT HEAD PERFUSION WITH CONTRAST: CPT

## 2020-02-12 PROCEDURE — 25000125 ZZHC RX 250: Performed by: INTERNAL MEDICINE

## 2020-02-12 RX ORDER — IOPAMIDOL 755 MG/ML
120 INJECTION, SOLUTION INTRAVASCULAR ONCE
Status: COMPLETED | OUTPATIENT
Start: 2020-02-12 | End: 2020-02-12

## 2020-02-12 RX ORDER — METHYLPREDNISOLONE 4 MG
TABLET, DOSE PACK ORAL
Qty: 21 TABLET | Refills: 0 | Status: ON HOLD | OUTPATIENT
Start: 2020-02-12 | End: 2021-02-05

## 2020-02-12 RX ADMIN — VENLAFAXINE HYDROCHLORIDE 150 MG: 150 CAPSULE, EXTENDED RELEASE ORAL at 08:17

## 2020-02-12 RX ADMIN — IOPAMIDOL 120 ML: 755 INJECTION, SOLUTION INTRAVENOUS at 12:46

## 2020-02-12 RX ADMIN — ASPIRIN 325 MG: 325 TABLET, COATED ORAL at 08:17

## 2020-02-12 RX ADMIN — SODIUM CHLORIDE 100 ML: 9 INJECTION, SOLUTION INTRAVENOUS at 12:46

## 2020-02-12 ASSESSMENT — ACTIVITIES OF DAILY LIVING (ADL)
IADL_COMMENTS: SUPPORTIVE FAMILY
PREVIOUS_RESPONSIBILITIES: MEAL PREP;HOUSEKEEPING;LAUNDRY;SHOPPING;YARDWORK;MEDICATION MANAGEMENT;FINANCES;DRIVING;WORK
ADLS_ACUITY_SCORE: 10

## 2020-02-12 NOTE — PLAN OF CARE
Pt admitted from the ED this evening. A/O x 4. Neuros intact. NIH completed. Tolerating reg diet. Up ind. Awaiting neuro consult. Will continue to monitor.

## 2020-02-12 NOTE — CONSULTS
[facility]    Stroke Consult Note    Reason for Consult:  Potential stroke    Chief Complaint: Headache (abnormal MRI)       PAGE Hunt is a 54 year old female who presented to the E.D. on 2/11/2020 with a three week history of new onset headaches located at the occipital area and traveling to the top of the head. She notices the pain to be worse when bending or sneezing. She rates the pain at a 6-7/10 and is characterized as stabbing. The pain is relieved at rest. She also has been taking Advil for pain relief. Associated symptoms include increased difficulty seeing objects close even with glasses on, she has commented on subjective stuttering (not noted on exam) and short term memory loss. She commented that her memory loss occurred this morning when she forgot where she placed her socks. This memory issue is new to her.  She denies any hearing or mentation changes, no difficulty swallowing, no chest pain, no shortness of breath, and denies any motor or sensory changes.     Impression  Occlusion of the left middle cerebral artery M1 segment was found on MRI after experiencing a 3 week history of occipital head pain. She does not present any acute infarct process on MRI. Due to her lack of clear risk factors and length of time to occlude such a large artery there is concern for a transient cerebral arteriopathy. This disease process typically occurs in kids and stems from a viral infection. Although due to adequate collateral circulation  around blockage seen on MRI there is no urgent intervention to be done at this time.    Recommendations  -Continue neuro checks q4 hours.  -Goal Hgb A1C <5.8  -Goal LDL <70      Patient Follow-up    {Follow-up recommendations:209510}    Thank you for this consult. {message to primary service:676035}    {Signature with Text Page link:474475}  _____________________________________________________    Past Medical History   Past Medical History:    Diagnosis Date     Anorexia nervosa      Dysplasia of cervix, unspecified      Generalized anxiety disorder      Herpes simplex without mention of complication     nose     Human papillomavirus in conditions classified elsewhere and of unspecified site      Mild recurrent major depression (H)      Past Surgical History   Past Surgical History:   Procedure Laterality Date     COLONOSCOPY N/A 7/20/2016    Procedure: COMBINED COLONOSCOPY, SINGLE OR MULTIPLE BIOPSY/POLYPECTOMY BY BIOPSY;  Surgeon: Duane, William Charles, MD;  Location: [facility] OR     COLONOSCOPY WITH CO2 INSUFFLATION N/A 7/20/2016    Procedure: COLONOSCOPY WITH CO2 INSUFFLATION;  Surgeon: Duane, William Charles, MD;  Location: [facility] OR     D & C       HYSTERECTOMY, VAGINAL  2002    for cervical dysplasia     MOUTH SURGERY      infected tooth, hospitalized for 8 days in 2/2017     SLING TRANSVAGINAL  6/27/2013    Procedure: SLING TRANSVAGINAL;  Cysto with TVT;  Surgeon: Michael Nicole MD;  Location: [facility] OR     TONSILLECTOMY & ADENOIDECTOMY       Medications   Home Meds  Prior to Admission medications    Medication Sig Start Date End Date Taking? Authorizing Provider   calcium carbonate-vitamin D (OS-LUIS) 500-400 MG-UNIT tablet Take 1 tablet by mouth daily   Yes Unknown, Entered By History   Multiple Vitamins-Minerals (MULTIVITAMIN ADULT) CHEW Take 2 chew tab by mouth daily   Yes Unknown, Entered By History   psyllium (METAMUCIL/KONSYL) capsule Take 1 capsule by mouth daily   Yes Unknown, Entered By History   venlafaxine (EFFEXOR-XR) 150 MG 24 hr capsule Take 1 capsule (150 mg) by mouth daily 11/29/19  Yes Inés Jaffe MD       Scheduled Meds    aspirin  325 mg Oral Daily    Or     aspirin  300 mg Rectal Daily     venlafaxine  150 mg Oral Daily       Infusion Meds    - MEDICATION INSTRUCTIONS -         PRN Meds  labetalol, - MEDICATION INSTRUCTIONS -    Allergies   Allergies   Allergen Reactions     Penicillins Hives     Family History   Family  History   Problem Relation Age of Onset     Cancer Mother         lung-smoker     Depression Mother      Hypertension Mother      Unknown/Adopted Father      Asthma No family hx of      C.A.D. No family hx of      Diabetes No family hx of      Cerebrovascular Disease No family hx of      Breast Cancer No family hx of      Cancer - colorectal No family hx of      Prostate Cancer No family hx of      Colon Cancer No family hx of      Social History   Social History     Tobacco Use     Smoking status: Never Smoker     Smokeless tobacco: Never Used   Substance Use Topics     Alcohol use: Yes     Comment: occasional, 3 drinks a week     Drug use: No       Review of Systems   The 10 point Review of Systems is negative other than noted in the HPI or here. ***       PHYSICAL EXAMINATION   Temp:  [98  F (36.7  C)-98.8  F (37.1  C)] 98.8  F (37.1  C)  Pulse:  [] 79  Heart Rate:  [] 93  Resp:  [12-20] 14  BP: (121-169)/() 133/78  SpO2:  [96 %-100 %] 98 %    Neurologic  Mental Status:  alert, oriented x 3, follows commands, speech clear and fluent, naming and repetition normal  Cranial Nerves:  visual fields intact, PERRL, EOMI with normal smooth pursuit, facial sensation intact and symmetric, facial movements symmetric, hearing not formally tested but intact to conversation, palate elevation symmetric and uvula midline, no dysarthria, shoulder shrug strong bilaterally, tongue protrusion midline  Motor:  normal muscle tone and bulk, no abnormal movements, able to move all limbs spontaneously, strength 5/5 throughout upper and lower extremities, no pronator drift  Reflexes:  2+ and symmetric throughout, no clonus  Sensory:  light touch sensation intact and symmetric throughout upper and lower extremities, pinprick sensation intact and symmetric  Coordination:  normal finger-to-nose and heel-to-shin bilaterally without dysmetria, rapid alternating movements symmetric  Station/Gait:  deferred    Dysphagia  Screen  Per Nursing    Stroke Scales    NIHSS  Interval baseline (02/11/20 2000)   Interval Comments Day 1 (02/12/20 0930)   1a. Level of Consciousness 0-->Alert, keenly responsive   1b. LOC Questions 0-->Answers both questions correctly   1c. LOC Commands 0-->Performs both tasks correctly   2.   Best Gaze 0-->Normal   3.   Visual 0-->No visual loss   4.   Facial Palsy 0-->Normal symmetrical movements   5a. Motor Arm, Left 0-->No drift, limb holds 90 (or 45) degrees for full 10 secs   5b. Motor Arm, Right 0-->No drift, limb holds 90 (or 45) degrees for full 10 secs   6a. Motor Leg, Left 0-->No drift, leg holds 30 degree position for full 5 secs   6b. Motor Leg, right 0-->No drift, leg holds 30 degree position for full 5 secs   7.   Limb Ataxia 0-->Absent   8.   Sensory 0-->Normal, no sensory loss   9.   Best Language 0-->No aphasia, normal   10. Dysarthria 0-->Normal   11. Extinction and Inattention  0-->No abnormality   Total 0 (02/12/20 0930)       Imaging  I personally reviewed all imaging; relevant findings per HPI.    Labs Data   CBC  Recent Labs   Lab 02/11/20  1655 02/11/20  0854   WBC 5.0 4.8   RBC 3.95 4.11   HGB 12.7 13.3   HCT 37.7 39.1    197     Basic Metabolic Panel   Recent Labs   Lab 02/11/20  1655 02/11/20  0854    139   POTASSIUM 3.9 4.2   CHLORIDE 106 106   CO2 29 26   BUN 16 18   CR 0.62 0.65   GLC 94 102*   LUIS 9.4 9.7     Liver Panel  Recent Labs   Lab Test 02/11/20  1655   PROTTOTAL 7.3   ALBUMIN 4.3   BILITOTAL 0.4   ALKPHOS 73   AST 20   ALT 22     INR  Recent Labs   Lab Test 02/11/20  1655   INR 0.98      Lipid Profile  Recent Labs   Lab Test 02/11/20  1955 07/10/19  1058 06/06/18  0732  11/13/15  0912 04/10/14  0839 06/27/12  1151   CHOL 223* 263* 280*   < > 214* 208* 215*    110 134   < > 106 106 100   LDL 96 141* 137*   < > 90 89 102   TRIG 62 62 44   < > 89 65 68   CHOLHDLRATIO  --   --   --   --  2.0 2.0 2.2    < > = values in this interval not displayed.      A1C  Recent Labs   Lab Test 02/11/20 1955   A1C 5.5     Troponin I  Recent Labs   Lab 02/12/20  0408 02/11/20 1955   TROPI <0.015 <0.015          Stroke Code / Stroke Consult Data Data This was a non-emergent, non-tele stroke consult.    {Attendings Only - how time spent (Optional):226304}

## 2020-02-12 NOTE — PLAN OF CARE
Date/Time 2/12 4398-9283    Trauma/Ortho/Medical (Choose one) neuro    Diagnosis: occluded L MCA  POD#: NA  Mental Status: A&Ox4  Activity/dangle: Independent  Diet: regular  Pain: headache 4/10 with movement  Rand/Voiding: bathroom  Tele/Restraints/Iso: Tele NSR  02/LDA: YURI, ZEE RABAGO  D/C Date: pending  Other Info:     2628-8781: A/Ox 4. VSS on RA. Tele NSR. Neuros intact ex headache 4/10 with movement. Up independently. Regular diet. Declines interventions for headache. Lung sounds clear. Continent B/B. Skin WDL. PIV SL. Tests/procedures: echo w bubble today. Neurology to see. CT w contrast planned for 2751-1202; NPO until then. Continue to monitor.

## 2020-02-12 NOTE — PLAN OF CARE
Discharge Planner OT   Patient plan for discharge: home  Current status: OT brit completed, pt lives with her  in a 2 story home. Was ind with all ADL's and iADL's including working and driving prior to admit. Pt feels she is pretty much at baseline, did say that she feels like her short term memory might be affected. Completed SLUMS exam and pt scored a 27/30, indicating normal cognition. Note that pt would rush to answer on the couple questions she got wrong. She missed reversal of 4 numbers, following directions to put x on the triangle and missed 1/5 of STM items.  felt that she seems like herself. Educated on PT being ordered too. Pt states she doesn't have any concerns about stairs, she is up amb independently in the room on her own. Does not feel that she has deficits with balance. Declined a need for PT   Barriers to return to prior living situation: none anticipated  Recommendations for discharge: home  Rationale for recommendations: no further skilled OT needs at this time       Entered by: Mylene Benitez 02/12/2020 1:58 PM

## 2020-02-12 NOTE — PLAN OF CARE
Discharge Planner PT   Patient plan for discharge: Defer to OT note  Current status: PT orders received, chart reviewed, discussed with OT. Per discussion, pt appears and feels to be at her baseline per pt/spouse. Pt has been up IND in room, declines concerns with mobility or balance. Pt declines need for PT.   Barriers to return to prior living situation: Defer to OT note  Recommendations for discharge: Defer to OT  Rationale for recommendations: Pt appears to be at baseline, has been mobilizing IND during hospitalization. PT eval not indicated at this time. Orders completed.       Entered by: Brina Sarmiento 02/12/2020 4:11 PM

## 2020-02-12 NOTE — PLAN OF CARE
RECEIVING UNIT ED HANDOFF REVIEW    ED Nurse Handoff Report was reviewed by: Juliet Carey RN on February 11, 2020 at 7:00 PM

## 2020-02-12 NOTE — PLAN OF CARE
Discharge Planner SLP   Patient plan for discharge: Home.   Current status: Consulted with patient after receiving SLP orders. MRI negative for acute CVA but with complete L MCA occlusion reported. Patient tolerating a regular diet with thin liquids and passed nursing swallow screen.  No hx of dysphagia or aspiration concerns. Recommend continue regular diet with thin liquids.  Patient with functional conversational language during discussion, but reporting intermittent word finding delays recently.   Barriers to return to prior living situation: No swallowing or language barriers to home return.   Recommendations for discharge: Home per SLP needs.   Rationale for recommendations: Recommend consider OP SLP evaluation for high level language assessment and intervention pending medical plan.  No current inpatient SLP needs.        Entered by: Joy Daley 02/12/2020 10:26 AM

## 2020-02-12 NOTE — PROGRESS NOTES
02/12/20 1334   Quick Adds   Type of Visit Initial Occupational Therapy Evaluation   Living Environment   Lives With spouse   Living Arrangements house   Home Accessibility no concerns;stairs within home;stairs to enter home   Self-Care   Usual Activity Tolerance good   Current Activity Tolerance good   Activity/Exercise/Self-Care Comment I with ADL's and IADL's including driving and working   General Information   Onset of Illness/Injury or Date of Surgery - Date 02/11/20   Referring Physician Demetrius Gutierrez MD   Patient/Family Goals Statement would like to go home   Additional Occupational Profile Info/Pertinent History of Current Problem Presents after MRI/A done for evaluation of headaches. Notes visual disturbance, also more forgetful. Pain in occipital area, worse with sneezing/ coughing/ bending over. [facility] with no evidence for infarction but with complete occlusion of her L MCA   Precautions/Limitations no known precautions/limitations   General Observations agreeable to OT eval   Cognitive Status Examination   Orientation orientation to person, place and time   Level of Consciousness alert   Follows Commands (Cognition) WNL   Memory intact   Cognitive Comment SLUMS score 27/30   Visual Perception   Visual Perception Wears glasses   Visual Perception Comments states she feels like her near vision is worse, but was using phone to play a game without difficulty.    Sensory Examination   Sensory Quick Adds No deficits were identified   Pain Assessment   Patient Currently in Pain No   Integumentary/Edema   Integumentary/Edema no deficits were identifed   Range of Motion (ROM)   ROM Quick Adds No deficits were identified   Strength   Manual Muscle Testing Quick Adds No deficits were identified   Muscle Tone Assessment   Muscle Tone Quick Adds No deficits were identified   Coordination   Upper Extremity Coordination No deficits were identified   Upper Body Dressing   Level of  "[place]: Dress Upper Body independent   Lower Body Dressing   Level of [place]: Dress Lower Body independent   Toileting   Level of [place]: Toilet independent  (per report)   Eating/Self Feeding   Level of [place]: Eating independent   Instrumental Activities of Daily Living (IADL)   Previous Responsibilities meal prep;housekeeping;laundry;shopping;yardwork;medication management;finances;driving;work   IADL Comments supportive family   Activities of Daily Living Analysis   ADL Comments none   Clinical Impression   Criteria for Skilled Therapeutic Interventions Met evaluation only   OT Diagnosis concern for decreased cognition   Predicted Duration of Therapy Intervention (days/wks) eval only   Anticipated Discharge Disposition Home   Risks and Benefits of Treatment have been explained. Yes   Patient, Family & other staff in agreement with plan of care Yes   [facility] TM \"6 Clicks\"   2016, Trustees of [facility], under license to [employer].  All rights reserved.   6 Clicks Short Forms Daily Activity Inpatient Short Form   [facility]  \"6 Clicks\" Daily Activity Inpatient Short Form   1. Putting on and taking off regular lower body clothing? 4 - None   2. Bathing (including washing, rinsing, drying)? 4 - None   3. Toileting, which includes using toilet, bedpan or urinal? 4 - None   4. Putting on and taking off regular upper body clothing? 4 - None   5. Taking care of personal grooming such as brushing teeth? 4 - None   6. Eating meals? 4 - None   Daily Activity Raw Score (Score out of 24.Lower scores equate to lower levels of function) 24     "

## 2020-02-12 NOTE — PROGRESS NOTES
[facility]    Hospitalist Progress Note      Assessment & Plan   Palmira Hunt is a 54 year old female with a history of depression and anxiety who was admitted on 2/11/2020 after outpatient MRI showed R MCA occlusion.    Left MCA occlusion  Headache  Patient evaluated for 2 weeks of daily posterior headache with visual changes, change in memory. [facility] complete occlusion of L MCA. Remainder of workup negative for infection.  --Neurology consulted, appreciate assistance. Discussed with Dr. Danielson- plan TBD until rounded on by team.   --q4 hour neuro checks  --asa 325mg daily  --ECHO with bubble negative for abnormalities   --seen by PT/OT/SLP without concerns   --defer statin to Neurology     HLD. Not on medication PTA. Total cholesterol 223, LDL 96.   --defer need for statin to Neurology    Elevated blood pressure. BP elevated 150-160s systolic. No history of hypertension diagnosis. Reports elevated BP at home past week 140s generally.   --permissive hypertension with prn labetalol for SBP >220  --anticipate discharge with low dose lisinopril and close PCP follow up     Depression. Continue Venlafaxine    DVT Prophylaxis: PCD  Code Status: No Order    Disposition: Expected discharge pending neurology evaluation-if ok to discharge this evening please notify Hospitalist     This patient was seen and examined with Dr. Denise who agrees with the above plan.    CARLA La-PRISCILA    Interval History   Doing well today. Continues to have 4/10 headache when moving neck, sneezing, jarring movements. No weakness, dysphagia. Feels vision at baseline.     -Data reviewed today: I reviewed all new labs and imaging results over the last 24 hours. I personally reviewed no images or EKG's today.    Physical Exam   Temp: 98.2  F (36.8  C) Temp src: Oral BP: 132/85 Pulse: 79 Heart Rate: 80 Resp: 16 SpO2: 97 % O2 Device: None (Room air)    Vitals:    02/11/20 1616   Weight:  65.8 kg (145 lb)     Vital Signs with Ranges  Temp:  [98  F (36.7  C)-98.5  F (36.9  C)] 98.2  F (36.8  C)  Pulse:  [] 79  Heart Rate:  [] 80  Resp:  [12-20] 16  BP: (121-169)/() 132/85  SpO2:  [96 %-100 %] 97 %  No intake/output data recorded.    Constitutional: Alert and oriented, sitting up in bed. Appears comfortable and is pleasantly conversant  ENT: normal cephalic, moist mucous membranes  Eyes:  Pupils are equal and reactive to light, EOMI  Respiratory: Lungs clear to auscultation bilaterally, no increased work of breathing or wheezing   Cardiovascular: Regular rate and rhythm, no murmur, no LE edema, distal pulses +2/4  GI:  active bowel sounds, abdomen soft  Skin/Integumen: warm, dry  MSK:  Moves all four extremities. strength is +5/5 and equal in upper and lower extremities bilaterally.   Neuro:  Cranial nerves 2-12 grossly intact. Speech clear. Face symmetric. Heel to shin normal. No focal deficits.     Medications     - MEDICATION INSTRUCTIONS -         aspirin  325 mg Oral Daily    Or     aspirin  300 mg Rectal Daily     venlafaxine  150 mg Oral Daily       Data   Recent Labs   Lab 02/12/20  0408 02/11/20  1955 02/11/20  1655 02/11/20  0854   WBC  --   --  5.0 4.8   HGB  --   --  12.7 13.3   MCV  --   --  95 95   PLT  --   --  209 197   INR  --   --  0.98  --    NA  --   --  139 139   POTASSIUM  --   --  3.9 4.2   CHLORIDE  --   --  106 106   CO2  --   --  29 26   BUN  --   --  16 18   CR  --   --  0.62 0.65   ANIONGAP  --   --  4 7   LUIS  --   --  9.4 9.7   GLC  --   --  94 102*   ALBUMIN  --   --  4.3  --    PROTTOTAL  --   --  7.3  --    BILITOTAL  --   --  0.4  --    ALKPHOS  --   --  73  --    ALT  --   --  22  --    AST  --   --  20  --    TROPI <0.015 <0.015  --   --        No results found for this or any previous visit (from the past 24 hour(s)).

## 2020-02-13 ENCOUNTER — PATIENT OUTREACH (OUTPATIENT)
Dept: CARE COORDINATION | Facility: CLINIC | Age: 55
End: 2020-02-13

## 2020-02-13 ENCOUNTER — TELEPHONE (OUTPATIENT)
Dept: FAMILY MEDICINE | Facility: CLINIC | Age: 55
End: 2020-02-13

## 2020-02-13 NOTE — PLAN OF CARE
Date/Time 2/12 7998-3559     Trauma/Ortho/Medical (Choose one) neuro     Diagnosis: occluded L MCA  POD#: NA  Mental Status: A&Ox4, neuro intact  Activity/dangle: Independent  Diet: regular  Pain: headache only with sudden movement  Rnad/Voiding: bathroom  Tele/Restraints/Iso: Tele NSR  02/LDA: ZEE WELCH  D/C Date: pending  Other Info: Head CT done today, echo pending, neuro consult pending, stroke education via ipad tomorrow @ 5792

## 2020-02-13 NOTE — DISCHARGE SUMMARY
[facility]    Discharge Summary  Hospitalist    Date of Admission:  2/11/2020  Date of Discharge:  2/12/2020  7:48 PM  Discharging Provider: Татьяна Braun PA-C    Discharge Diagnoses    Headache  Left middle cerebral artery occlusion  Elevated blood pressure  Depression    History of Present Illness   Palmira Hunt is a 54 year old female with a history of depression and anxiety who was admitted on 2/11/2020 after outpatient MRI showed R MCA occlusion. Patient was seen in outpatient setting for 3 weeks of intractable headache with occasional sight visual changes and memory trouble. She underwent outpatient MRI with findings of MCA occlusion and was directed to the ED. She was admitted to [facility] for Neurology consultation and ongoing monitoring. Please see admission H&P by Dr. Gutierrez for additional information.     Hospital Course   Palmira Hunt was admitted on 2/11/2020.  The following problems were addressed during her hospitalization:    Left MCA occlusion  Headache  Patient evaluated for 2 weeks of daily posterior headache with visual changes, change in memory. [facility] L MCA. Remainder of workup negative for infection. ECHO with bubble negative for abnormalities. Seen by PT/OT/SLP without concerns.  Neurology consulted. Discussed with Dr. Doyle over the phone evening of discharge. At time of this note creation formal Neurology note is not completed. Unlcear whether headaches related to MCA occlusion. Occlusion likely not related to atherosclerotic disease and cholesterol ok- no need for statin at this time. Ok to discharge per neurology with medrol dosepak to see if this helps with headache- if increased ICP this may worsen symptoms he discussed with patient that if headaches worsen with this medication she should return for evaluation. Will discharge with asa 81mg daily for the time being. Advised against treating blood  pressure at this time to maximize perfusion- could consider treatment in outpatient setting if hypertension persists but would recommend treating slowly. Patient advised to return to ED immediately with any neurologic changes. She will follow up with cerebrovascular clinic at [facility] and was given the number to schedule this. She should also be seen in headache clinic if not improved with steroid course.     Please see final Neurology note once signed for further information.      DLD. Not on medication PTA. Total cholesterol 223, LDL 96.   --no need for statin     Elevated blood pressure. BP elevated 150-160s systolic. No history of hypertension diagnosis. Reports elevated BP at home past week 140s generally.   --permissive hypertension for now  --daily BP at home, follow up with PCP to discuss. Recommendations from Neurology as above     Depression. Continued Venlafaxine    This patient was seen and examined with Dr. Denise who agrees with the above plan.    Татьяна Braun PA-C, PAElieC    Significant Results and Procedures   See below     Code Status   Full code        Primary Care Physician   [facility]    Physical Exam       BP: (!) 145/96   Heart Rate: 87          Vitals:    02/11/20 1616   Weight: 65.8 kg (145 lb)     Vital Signs with Ranges  Heart Rate:  [87] 87  BP: (145)/(96) 145/96  No intake/output data recorded.       Constitutional: Alert and oriented, sitting up in bed. Appears comfortable and is pleasantly conversant  ENT: normal cephalic, moist mucous membranes  Eyes:  Pupils are equal and reactive to light, EOMI  Respiratory: Lungs clear to auscultation bilaterally, no increased work of breathing or wheezing   Cardiovascular: Regular rate and rhythm, no murmur, no LE edema, distal pulses +2/4  GI:  active bowel sounds, abdomen soft  Skin/Integumen: warm, dry  MSK:  Moves all four extremities. strength is +5/5 and equal in upper and lower extremities bilaterally.   Neuro:  Cranial  nerves 2-12 grossly intact. Speech clear. Face symmetric. Heel to shin normal. No focal deficits.     Discharge Disposition   Discharged to home  Condition at discharge: Stable    Consultations This Hospital Stay   NEUROLOGY IP CONSULT  PHYSICAL THERAPY ADULT IP CONSULT  OCCUPATIONAL THERAPY ADULT IP CONSULT  SPEECH LANGUAGE PATH ADULT IP CONSULT  SWALLOW EVAL SPEECH PATH AT BEDSIDE IP CONSULT  SMOKING CESSATION PROGRAM IP CONSULT  PATIENT LEARNING CENTER IP CONSULT    Time Spent on this Encounter   IТатьяна PA-C, personally saw the patient today and spent greater than 30 minutes discharging this patient.    Discharge Orders      Reason for your hospital stay    You were here for evaluation of headaches     Follow-up and recommended labs and tests     Follow up with primary care provider within one week for hospital follow up. Recheck BP.     Neurology would like you to follow up in headache clinic at the [place] with either Dr. Nichols or Dr. Abbasi if your symptoms are not improving. You can call [phone] to schedule this.     Because of the occluded vessel we found on imaging you will need to follow up with the cerebrovascular clinic at the [facility] in the next few months as well. You can call the same number as above to schedule this. You will likely be seen there annually to follow this up.     Activity    Your activity upon discharge: activity as tolerated     When to contact your care team    If headaches worsen while taking the steroid course you should be re-evaluated.     If you develop any weakness, speech changes, significant vision changes, sensation changes or other neurologic symptoms you need to be re-evaluated right away     Discharge Instructions    Continue daily baby aspirin for now. Take this with food to avoid stomach irritation    Check you blood pressure daily at home and keep a log for your primary care provider. We are going to hold off on starting any  medications for now to maximize perfusion in your brain. You will be reassessed by your primary care provider to see if your pressure improves as your headaches improve.     Full Code     Diet    Follow this diet upon discharge: Orders Placed This Encounter      Regular Diet Adult     Discharge Medications   Discharge Medication List as of 2/12/2020  7:23 PM      START taking these medications    Details   aspirin (ASA) 81 MG EC tablet Take 1 tablet (81 mg) by mouth daily, Disp-30 tablet, R-0, E-PrescribeRefills per PCP      methylPREDNISolone (MEDROL DOSEPAK) 4 MG tablet therapy pack Follow Package Directions, Disp-21 tablet, R-0, E-Prescribe         CONTINUE these medications which have NOT CHANGED    Details   venlafaxine (EFFEXOR-XR) 150 MG 24 hr capsule Take 1 capsule (150 mg) by mouth daily, Disp-90 capsule, R-0, E-Prescribe      calcium carbonate-vitamin D (OS-LUIS) 500-400 MG-UNIT tablet Take 1 tablet by mouth daily, Historical      Multiple Vitamins-Minerals (MULTIVITAMIN ADULT) CHEW Take 2 chew tab by mouth daily, Historical      psyllium (METAMUCIL/KONSYL) capsule Take 1 capsule by mouth daily, Historical           Allergies   Allergies   Allergen Reactions     Penicillins Hives     Data   Most Recent 3 CBC's:  Recent Labs   Lab Test 02/11/20  1655 02/11/20  0854   WBC 5.0 4.8   HGB 12.7 13.3   MCV 95 95    197      Most Recent 3 BMP's:  Recent Labs   Lab Test 02/11/20  1655 02/11/20  0854 07/10/19  1058    139 138   POTASSIUM 3.9 4.2 4.5   CHLORIDE 106 106 105   CO2 29 26 27   BUN 16 18 17   CR 0.62 0.65 0.64   ANIONGAP 4 7 6   LUIS 9.4 9.7 9.2   GLC 94 102* 90     Most Recent 2 LFT's:  Recent Labs   Lab Test 02/11/20  1655   AST 20   ALT 22   ALKPHOS 73   BILITOTAL 0.4     Most Recent INR's and Anticoagulation Dosing History:  Anticoagulation Dose History     Recent Dosing and Labs Latest Ref Rng & Units 2/11/2020    INR 0.86 - 1.14 0.98        Most Recent 3 Troponin's:  Recent Labs   Lab  Test 02/12/20  0408 02/11/20 1955   TROPI <0.015 <0.015     Most Recent Cholesterol Panel:  Recent Labs   Lab Test 02/11/20 1955   CHOL 223*   LDL 96      TRIG 62     Most Recent 6 Bacteria Isolates From Any Culture (See EPIC Reports for Culture Details):  Recent Labs   Lab Test 12/13/12  1050   CULT <10,000 colonies/mL Mixed gram positive carlota Multiple species present, probable perineal contamination. Susceptibility testing not routinely done     Most Recent TSH, T4 and A1c Labs:  Recent Labs   Lab Test 02/11/20 1955 02/11/20  0854   TSH  --  2.51   A1C 5.5  --      Results for orders placed or performed during the hospital encounter of 02/11/20   CTA Head Neck with Contrast    Narrative    CT ANGIOGRAM OF THE HEAD AND NECK WITH CONTRAST February 12, 2020 1:00  PM     HISTORY: Headache, stuttering, some visual changes, abnormal MRA head.      TECHNIQUE: CT angiography with an injection of 70mL Isovue-370 IV with  scans through the head and neck. Images were transferred to a separate  3-D workstation where multiplanar reformations and 3-D images were  created. Estimates of carotid stenoses are made relative to the distal  internal carotid artery diameters except as noted. Radiation dose for  this scan was reduced using automated exposure control, adjustment of  the mA and/or kV according to patient size, or iterative  reconstruction technique.    COMPARISON: None.     CT ANGIOGRAM HEAD FINDINGS: The bilateral vertebral arteries, basilar  artery, and posterior cerebral arteries are patent. Left vertebral  artery is diminutive, most conspicuous at the V4 segment. The internal  carotid arteries, anterior cerebral arteries, and right middle  cerebral artery are patent. The left middle cerebral artery is  occluded at the M1 segments with multiple small vessels along the  expected location of the left middle cerebral artery segment with  opacification of more peripheral left middle cerebral artery M2  branches,  consistent with chronic occlusion and collateralization.  Small left anterior communicating artery is present. Patent anterior  communicating artery is present. No aneurysms are identified. Dural  venous sinuses are unremarkable.    CT ANGIOGRAM NECK FINDINGS: A 4 vessel aortic arch is present with  origin of left vertebral artery directly from the aortic arch. No  evidence of dissection, occlusion, or flow limiting stenosis.    Mild cervical spine degenerative change is present most conspicuous at  C5-C6. Thyroid gland is mildly heterogeneous in attenuation with  multiple subcentimeter nodules for which no dedicated imaging  follow-up is required.       Impression    IMPRESSION:   CTA Head: Occlusion of the left middle cerebral artery M1 segment with  small vessel collateral reconstitution and asymmetric decreased  filling of the more peripheral left middle cerebral artery branches.  The findings are most consistent with chronic occlusion, presumably  related to old developmental vascular insult or arteriopathy.    CTA Neck: Unremarkable.     KEITH FISCHER MD   CT Head w/o Contrast    Narrative    CT SCAN OF THE HEAD WITHOUT CONTRAST   2/12/2020 12:56 PM     HISTORY: Headache, stuttering, visual changes, abnormal MRA head.    TECHNIQUE:  Axial images of the head and coronal reformations without  IV contrast material. Radiation dose for this scan was reduced using  automated exposure control, adjustment of the mA and/or kV according  to patient size, or iterative reconstruction technique.    COMPARISON: Head MRI 2/11/2020    FINDINGS:  Mild volume loss is present. White matter hypoattenuation  likely represents mild chronic small vessel ischemic change. The  cerebral hemispheres, brainstem, and cerebellum otherwise demonstrate  normal morphology and attenuation. No evidence of acute ischemia,  hemorrhage, mass, mass effect or hydrocephalus. The visualized  calvarium, tympanic cavities, mastoid cavities, and  paranasal sinuses  are unremarkable.      Impression    IMPRESSION: No acute intracranial abnormality.    KEITH FISCHER MD   CT Head Perfusion w Contrast    Narrative    CT BRAIN PERFUSION 2020 1:00 PM    HISTORY: Headache, stuttering, some visual changes, abnormal MRA head.    TECHNIQUE: Time sequential axial CT images of the head were acquired  during the administration of 50 mL Isovue-370 IV. Color perfusion maps  of the brain were created from this time sequential axial source data.      Radiation dose for this scan was reduced using automated exposure  control, adjustment of the mA and/or kV according to patient size, or  iterative reconstruction technique.    COMPARISON: None.      Impression    IMPRESSION: Slight transit time prolongation is present corresponding  to the left middle cerebral artery distribution. Cerebral blood  volumes are symmetric and compensated. Perfusion pattern is otherwise  unremarkable.    KEITH FISCHER MD   Echocardiogram Complete - Bubble study    Narrative    [id number]  [id number]  [id number]  [id number]^GUILLERMO^KEITH^JOEL           [facility]  Echocardiography Laboratory  [address] [place] [address]        Name: MINI AYALA  MRN: [id number]  : 1965  Study Date: 2020 02:05 PM  Age: 54 yrs  Gender: Female  Patient Location: [facility]  Reason For Study: CVA  Ordering Physician: KEITH LI  Referring Physician: Deepa [facility]  Performed By: Jocelyn Etienne     BSA: 1.8 m2  Height: 67 in  Weight: 145 lb  HR: 83  BP: 153/94 mmHg  _____________________________________________________________________________  __        Procedure  Complete Portable Bubble Echo Adult.  _____________________________________________________________________________  __        Interpretation Summary        Left ventricular size, global systolic function, and wall motion are normal,  estimated LVEF 55-60%.  Right ventricular global  function is normal.  No significant valvular abnormalities.  Intact atrial septum. A contrast injection (Bubble Study) was performed that  was negative for flow across the interatrial septum. A Valsalva maneuver was  performed.     There are no prior studies available for comparison.  _____________________________________________________________________________  __        Left Ventricle  The left ventricle is normal in size. There is normal left ventricular wall  thickness. Left ventricular systolic function is normal. The visual ejection  fraction is estimated at 55-60%. Left ventricular diastolic function is  normal. Normal left ventricular wall motion.     Right Ventricle  The right ventricle is normal in structure, function and size.     Atria  Normal left atrial size. Right atrial size is normal. Intact atrial septum. A  contrast injection (Bubble Study) was performed that was negative for flow  across the interatrial septum. A Valsalva maneuver was performed.     Mitral Valve  The mitral valve is normal in structure and function. There is trace mitral  regurgitation.        Tricuspid Valve  The tricuspid valve is not well visualized, but is grossly normal. There is  physiologic tricuspid regurgitation. Right ventricular systolic pressure could  not be approximated due to inadequate tricuspid regurgitation.     Aortic Valve  The aortic valve is normal in structure and function.     Pulmonic Valve  The pulmonic valve is not well seen, but is grossly normal.     Vessels  The aortic root is normal size. Normal size ascending aorta. The inferior vena  cava was normal in size with preserved respiratory variability.     Pericardium  There is no pericardial effusion.     _____________________________________________________________________________  __  MMode/2D Measurements & Calculations  IVSd: 0.80 cm  LVIDd: 4.8 cm  LVIDs: 3.4 cm  LVPWd: 0.93 cm  FS: 28.8 %  LV mass(C)d: 140.2 grams  LV mass(C)dI: 79.5 grams/m2      Ao root diam: 3.1 cm  LA dimension: 2.9 cm  asc Aorta Diam: 3.5 cm  LA/Ao: 0.94  LVOT diam: 2.0 cm  LVOT area: 3.0 cm2  LA Volume (BP): 43.7 ml  LA Volume Index (BP): 24.8 ml/m2  RWT: 0.39        Doppler Measurements & Calculations  MV E max ryan: 81.8 cm/sec  MV A max ryan: 108.8 cm/sec  MV E/A: 0.75  MV dec slope: 405.6 cm/sec2     Ao V2 max: 120.2 cm/sec  Ao max P.0 mmHg  Ao V2 mean: 96.6 cm/sec  Ao mean PG: 3.9 mmHg  Ao V2 VTI: 24.8 cm  TOREY(I,D): 2.4 cm2  TOREY(V,D): 2.5 cm2  LV V1 max P.0 mmHg  LV V1 max: 99.8 cm/sec  LV V1 VTI: 20.1 cm  SV(LVOT): 60.4 ml  SI(LVOT): 34.3 ml/m2  PA acc time: 0.13 sec  PI end-d ryan: 83.0 cm/sec  AV Ryan Ratio (DI): 0.83  TOREY Index (cm2/m2): 1.4  E/E' avg: 10.5  Lateral E/e': 9.0  Medial E/e': 12.0              _____________________________________________________________________________  __        Report approved by: Risa Duran 2020 03:28 PM

## 2020-02-13 NOTE — PROGRESS NOTES
Discharge    Patient discharged to home with family.    Listed belongings gathered and returned to patient. Yes  Care Plan and Patient education resolved: Yes  Prescriptions if needed, hard copies sent with patient  :electroncally sent to her pharmacy  Home and hospital acquired medications returned to patient: NA  Medication Bin checked and emptied on discharge Yes  Follow up appointment made for patient: No, patient informed to make the follow up appointments

## 2020-02-13 NOTE — PROGRESS NOTES
Clinic Care Coordination Contact    Situation: Patient chart reviewed by care coordinator.    Background: On ED visit with admission in past five years.    Assessment: 54 year old female presented with headache x 3 weeks, abnormal MRI. Found to have left middle cerebral artery occlusion.      Plan/Recommendations: Discharged home for outpatient follow up.    Clinic care coordination is not indicated.    Rosi Alarcon RN, [facility] - Primary Care Clinic RN Coordinator  [facility]   2/13/2020    9:56 AM  [phone]

## 2020-02-24 DIAGNOSIS — I65.1 OCCLUSION AND STENOSIS OF BASILAR ARTERY: ICD-10-CM

## 2020-02-24 DIAGNOSIS — I10 HTN (HYPERTENSION): Primary | ICD-10-CM

## 2020-02-25 ENCOUNTER — MEDICAL CORRESPONDENCE (OUTPATIENT)
Dept: HEALTH INFORMATION MANAGEMENT | Facility: CLINIC | Age: 55
End: 2020-02-25

## 2020-03-04 ENCOUNTER — HOSPITAL ENCOUNTER (OUTPATIENT)
Dept: CARDIOLOGY | Facility: CLINIC | Age: 55
Discharge: HOME | End: 2020-03-04
Attending: FAMILY MEDICINE | Admitting: FAMILY MEDICINE
Payer: COMMERCIAL

## 2020-03-04 DIAGNOSIS — I10 HTN (HYPERTENSION): ICD-10-CM

## 2020-03-04 DIAGNOSIS — I65.1 OCCLUSION AND STENOSIS OF BASILAR ARTERY: ICD-10-CM

## 2020-03-04 PROCEDURE — 93270 REMOTE 30 DAY ECG REV/REPORT: CPT

## 2020-03-04 PROCEDURE — 93272 ECG/REVIEW INTERPRET ONLY: CPT | Performed by: INTERNAL MEDICINE

## 2020-03-09 DIAGNOSIS — F32.5 MAJOR DEPRESSION IN COMPLETE REMISSION (H): ICD-10-CM

## 2020-03-09 NOTE — TELEPHONE ENCOUNTER
"Requested Prescriptions   Pending Prescriptions Disp Refills     venlafaxine (EFFEXOR-XR) 150 MG 24 hr capsule 90 capsule 0     Sig: Take 1 capsule (150 mg) by mouth daily       Serotonin-Norepinephrine Reuptake Inhibitors  Failed - 3/9/2020  2:37 PM        Failed - Blood pressure under 140/90 in past 12 months     BP Readings from Last 3 Encounters:   02/12/20 (!) 145/96   02/11/20 (!) 150/96   11/14/19 139/84                 Passed - PHQ-9 score of less than 5 in past 6 months     Please review last PHQ-9 score.           Passed - Medication is active on med list        Passed - Patient is age 18 or older        Passed - No active pregnancy on record        Passed - Normal serum creatinine on file in past 12 months     Recent Labs   Lab Test 02/11/20  1655   CR 0.62             Passed - No positive pregnancy test in past 12 months        Passed - Recent (6 mo) or future (30 days) visit within the authorizing provider's specialty     Patient had office visit in the last 6 months or has a visit in the next 30 days with authorizing provider or within the authorizing provider's specialty.  See \"Patient Info\" tab in inbasket, or \"Choose Columns\" in Meds & Orders section of the refill encounter.               venlafaxine (EFFEXOR-XR) 150 MG 24 hr capsule  Last Written Prescription Date:  11/29/19  Last Fill Quantity: 90,  # refills: 0   Last office visit: 2/11/2020 with prescribing provider:  Dr. Jaffe   Future Office Visit:      "

## 2020-03-12 RX ORDER — VENLAFAXINE HYDROCHLORIDE 150 MG/1
150 CAPSULE, EXTENDED RELEASE ORAL DAILY
Qty: 90 CAPSULE | Refills: 0 | Status: SHIPPED | OUTPATIENT
Start: 2020-03-12 | End: 2020-06-09

## 2020-06-09 DIAGNOSIS — F32.5 MAJOR DEPRESSION IN COMPLETE REMISSION (H): ICD-10-CM

## 2020-06-09 RX ORDER — VENLAFAXINE HYDROCHLORIDE 150 MG/1
CAPSULE, EXTENDED RELEASE ORAL
Qty: 30 CAPSULE | Refills: 0 | Status: SHIPPED | OUTPATIENT
Start: 2020-06-09

## 2020-06-09 NOTE — TELEPHONE ENCOUNTER
30 day fill given. Needs virtual visit before further refills.  CINDY Becker, NP-C  [facility]

## 2020-06-09 NOTE — TELEPHONE ENCOUNTER
Routing to Suzanne- pk for physical 7/2019 - I have not addressed this med with patient - may need virtual visit

## 2020-07-20 DIAGNOSIS — F32.5 MAJOR DEPRESSION IN COMPLETE REMISSION (H): ICD-10-CM

## 2020-07-20 NOTE — LETTER
July 30, 2020      Palmira Hunt  [address]  [place] [address]        Dear Palmira,       You are overdue for appointment for follow-up of your medications.  Please call if any comfortable, phone or video visit for that.  We can then give a small refill until that appointment.  Please call if you have any questions.    Sincerely,        CINDY Becker, NP-C  [facility]

## 2020-07-23 RX ORDER — VENLAFAXINE HYDROCHLORIDE 150 MG/1
CAPSULE, EXTENDED RELEASE ORAL
Qty: 30 CAPSULE | Refills: 0 | OUTPATIENT
Start: 2020-07-23

## 2020-07-23 NOTE — TELEPHONE ENCOUNTER
Routing refill request to provider for review/approval because:  BP and PHQ-9 failed protocol.    Angelica Tomas RN, [facility] Triage

## 2020-07-23 NOTE — TELEPHONE ENCOUNTER
Needs appointment for further refills-we already gave geena refill. Please call to set that up. Can give small refill to make it until appointment once made.  Thank you,  CINDY Becker, NP-C  [facility]

## 2020-07-24 NOTE — TELEPHONE ENCOUNTER
This writer attempted to contact pt on 07/24/20      Reason for call schedule virtual appt and left message.      If patient calls back:   Schedule virtual appointment within 2 weeks with primary care, document that pt called and route back to provider for possible geena refill        Roxie Flanagan

## 2020-07-28 NOTE — TELEPHONE ENCOUNTER
LM for pt to call clinic.  3rd attempt, with no response.  Please advise.      Billie NAILS, Patient Care

## 2020-07-30 NOTE — TELEPHONE ENCOUNTER
Please mail letter that was created.  Then close encounter.  CINDY Becker, NP-C  [facility]

## 2020-10-21 ENCOUNTER — TRANSFERRED RECORDS (OUTPATIENT)
Dept: HEALTH INFORMATION MANAGEMENT | Facility: CLINIC | Age: 55
End: 2020-10-21

## 2020-10-22 ENCOUNTER — TRANSFERRED RECORDS (OUTPATIENT)
Dept: HEALTH INFORMATION MANAGEMENT | Facility: CLINIC | Age: 55
End: 2020-10-22

## 2020-11-14 ENCOUNTER — HEALTH MAINTENANCE LETTER (OUTPATIENT)
Age: 55
End: 2020-11-14

## 2020-12-08 ENCOUNTER — PATIENT OUTREACH (OUTPATIENT)
Dept: PEDIATRICS | Facility: CLINIC | Age: 55
End: 2020-12-08

## 2020-12-08 NOTE — TELEPHONE ENCOUNTER
2002- history of TVH for cervical dysplasia.  5/6/09 pap LSIL  2/25/10 colpo/repeat pap- NIL. Plan-- repeat pap smear in 6 months at time of annual exam  7/15/10 pap NIL/neg HPV.   10/20/11 no pap per Dr Duran.  Annual exam 1 year.  6/18/13 NIL pap, neg HR HPV. Plan; pap in 3 years  11/15/13 NIL pap. Plan: pap in 3 years.  6/11/18 NIL pap, + HR HPV (not 16 or 18) Plan: cotest in 1 yr.  7/1/19 Lost to follow-up for pap tracking  7/8/19 NIL vaginal Pap, + HR HPV (Neg 16/18). Plan pap in 1 year per provider.  11/2/20 Reminder Mychart - mychart not read  12/8/20 Reminder call - spoke to patient.

## 2021-01-07 NOTE — TELEPHONE ENCOUNTER
FYI to provider - Patient is lost to pap tracking follow-up. Attempts to contact pt have been made per reminder process and there has been no reply and/or no appt scheduled.

## 2021-01-24 DIAGNOSIS — Z11.59 ENCOUNTER FOR SCREENING FOR OTHER VIRAL DISEASES: Primary | ICD-10-CM

## 2021-02-02 DIAGNOSIS — Z11.59 ENCOUNTER FOR SCREENING FOR OTHER VIRAL DISEASES: ICD-10-CM

## 2021-02-02 LAB
SARS-COV-2 RNA RESP QL NAA+PROBE: NORMAL
SPECIMEN SOURCE: NORMAL

## 2021-02-02 PROCEDURE — U0005 INFEC AGEN DETEC AMPLI PROBE: HCPCS | Performed by: COLON & RECTAL SURGERY

## 2021-02-02 PROCEDURE — U0003 INFECTIOUS AGENT DETECTION BY NUCLEIC ACID (DNA OR RNA); SEVERE ACUTE RESPIRATORY SYNDROME CORONAVIRUS 2 (SARS-COV-2) (CORONAVIRUS DISEASE [COVID-19]), AMPLIFIED PROBE TECHNIQUE, MAKING USE OF HIGH THROUGHPUT TECHNOLOGIES AS DESCRIBED BY CMS-2020-01-R: HCPCS | Performed by: COLON & RECTAL SURGERY

## 2021-02-03 LAB
LABORATORY COMMENT REPORT: NORMAL
SARS-COV-2 RNA RESP QL NAA+PROBE: NEGATIVE
SPECIMEN SOURCE: NORMAL

## 2021-02-05 ENCOUNTER — HOSPITAL ENCOUNTER (OUTPATIENT)
Facility: CLINIC | Age: 56
Discharge: HOME | End: 2021-02-05
Attending: COLON & RECTAL SURGERY | Admitting: COLON & RECTAL SURGERY
Payer: COMMERCIAL

## 2021-02-05 VITALS
WEIGHT: 145 LBS | HEIGHT: 67 IN | BODY MASS INDEX: 22.76 KG/M2 | OXYGEN SATURATION: 99 % | DIASTOLIC BLOOD PRESSURE: 94 MMHG | TEMPERATURE: 97.7 F | SYSTOLIC BLOOD PRESSURE: 129 MMHG | RESPIRATION RATE: 16 BRPM | HEART RATE: 77 BPM

## 2021-02-05 LAB — COLONOSCOPY: NORMAL

## 2021-02-05 PROCEDURE — 99153 MOD SED SAME PHYS/QHP EA: CPT | Performed by: COLON & RECTAL SURGERY

## 2021-02-05 PROCEDURE — G0121 COLON CA SCRN NOT HI RSK IND: HCPCS | Performed by: COLON & RECTAL SURGERY

## 2021-02-05 PROCEDURE — G0500 MOD SEDAT ENDO SERVICE >5YRS: HCPCS | Performed by: COLON & RECTAL SURGERY

## 2021-02-05 PROCEDURE — 250N000011 HC RX IP 250 OP 636: Performed by: COLON & RECTAL SURGERY

## 2021-02-05 PROCEDURE — 45378 DIAGNOSTIC COLONOSCOPY: CPT | Performed by: COLON & RECTAL SURGERY

## 2021-02-05 RX ORDER — ONDANSETRON 2 MG/ML
4 INJECTION INTRAMUSCULAR; INTRAVENOUS
Status: DISCONTINUED | OUTPATIENT
Start: 2021-02-05 | End: 2021-02-05 | Stop reason: HOSPADM

## 2021-02-05 RX ORDER — ONDANSETRON 2 MG/ML
4 INJECTION INTRAMUSCULAR; INTRAVENOUS EVERY 6 HOURS PRN
Status: DISCONTINUED | OUTPATIENT
Start: 2021-02-05 | End: 2021-02-05 | Stop reason: HOSPADM

## 2021-02-05 RX ORDER — NALOXONE HYDROCHLORIDE 0.4 MG/ML
0.4 INJECTION, SOLUTION INTRAMUSCULAR; INTRAVENOUS; SUBCUTANEOUS
Status: DISCONTINUED | OUTPATIENT
Start: 2021-02-05 | End: 2021-02-05 | Stop reason: HOSPADM

## 2021-02-05 RX ORDER — FLUMAZENIL 0.1 MG/ML
0.2 INJECTION, SOLUTION INTRAVENOUS
Status: DISCONTINUED | OUTPATIENT
Start: 2021-02-05 | End: 2021-02-05 | Stop reason: HOSPADM

## 2021-02-05 RX ORDER — FENTANYL CITRATE 50 UG/ML
INJECTION, SOLUTION INTRAMUSCULAR; INTRAVENOUS PRN
Status: DISCONTINUED | OUTPATIENT
Start: 2021-02-05 | End: 2021-02-05 | Stop reason: HOSPADM

## 2021-02-05 RX ORDER — ONDANSETRON 4 MG/1
4 TABLET, ORALLY DISINTEGRATING ORAL EVERY 6 HOURS PRN
Status: DISCONTINUED | OUTPATIENT
Start: 2021-02-05 | End: 2021-02-05 | Stop reason: HOSPADM

## 2021-02-05 RX ORDER — NALOXONE HYDROCHLORIDE 0.4 MG/ML
0.2 INJECTION, SOLUTION INTRAMUSCULAR; INTRAVENOUS; SUBCUTANEOUS
Status: DISCONTINUED | OUTPATIENT
Start: 2021-02-05 | End: 2021-02-05 | Stop reason: HOSPADM

## 2021-02-05 RX ORDER — DIPHENHYDRAMINE HYDROCHLORIDE 50 MG/ML
INJECTION INTRAMUSCULAR; INTRAVENOUS PRN
Status: DISCONTINUED | OUTPATIENT
Start: 2021-02-05 | End: 2021-02-05 | Stop reason: HOSPADM

## 2021-02-05 RX ORDER — DIPHENHYDRAMINE HCL 25 MG
25 CAPSULE ORAL EVERY 4 HOURS PRN
Status: DISCONTINUED | OUTPATIENT
Start: 2021-02-05 | End: 2021-02-05 | Stop reason: HOSPADM

## 2021-02-05 RX ORDER — LIDOCAINE 40 MG/G
CREAM TOPICAL
Status: DISCONTINUED | OUTPATIENT
Start: 2021-02-05 | End: 2021-02-05 | Stop reason: HOSPADM

## 2021-02-05 RX ORDER — DIPHENHYDRAMINE HYDROCHLORIDE 50 MG/ML
25 INJECTION INTRAMUSCULAR; INTRAVENOUS EVERY 4 HOURS PRN
Status: DISCONTINUED | OUTPATIENT
Start: 2021-02-05 | End: 2021-02-05 | Stop reason: HOSPADM

## 2021-02-05 ASSESSMENT — MIFFLIN-ST. JEOR: SCORE: 1275.35

## 2021-02-05 NOTE — H&P
Pre-Endoscopy History and Physical     Palmira Hunt MRN# [id number]   YOB: 1965 Age: 55 year old     Date of Procedure: 02/05/21  Primary care provider: Marilee Granger  Type of Endoscopy: Colonoscopy  Reason for Procedure: previous hyperplastic polyp  Type of Anesthesia Anticipated: Moderate Sedation    HPI:    Palmira is a 55 year old female who will be undergoing the above procedure.      A history and physical has been performed. The patient's medications and allergies have been reviewed. The risks and benefits of the procedure and the sedation options and risks were discussed with the patient.  All questions were answered and informed consent was obtained.      She denies a personal or family history of anesthesia complications or bleeding disorders.     Allergies   Allergen Reactions     Penicillins Hives        No current facility-administered medications on file prior to encounter.        calcium carbonate-vitamin D (OS-LUIS) 500-400 MG-UNIT tablet, Take 1 tablet by mouth daily       Multiple Vitamins-Minerals (MULTIVITAMIN ADULT) CHEW, Take 2 chew tab by mouth daily       psyllium (METAMUCIL/KONSYL) 58.6 % powder, Take by mouth daily       venlafaxine (EFFEXOR-XR) 150 MG 24 hr capsule, TAKE 1 CAPSULE BY MOUTH EVERY DAY        Patient Active Problem List   Diagnosis     Herpes simplex virus (HSV) infection     CARDIOVASCULAR SCREENING; LDL GOAL LESS THAN 160     Papanicolaou smear of cervix with low grade squamous intraepithelial lesion (LGSIL)     Generalized anxiety disorder     Menopausal symptoms     Major depression in complete remission (H)     Acute ischemic left MCA stroke (H)        Past Medical History:   Diagnosis Date     Anorexia nervosa      Dysplasia of cervix, unspecified      Generalized anxiety disorder      Herpes simplex without mention of complication     nose     Human papillomavirus in conditions classified elsewhere and of unspecified site      Mild recurrent  "major depression (H)         Past Surgical History:   Procedure Laterality Date     COLONOSCOPY N/A 7/20/2016    Procedure: COMBINED COLONOSCOPY, SINGLE OR MULTIPLE BIOPSY/POLYPECTOMY BY BIOPSY;  Surgeon: Duane, William Charles, MD;  Location: [facility] OR     COLONOSCOPY WITH CO2 INSUFFLATION N/A 7/20/2016    Procedure: COLONOSCOPY WITH CO2 INSUFFLATION;  Surgeon: Duane, William Charles, MD;  Location: [facility] OR     D & C       HYSTERECTOMY, VAGINAL  2002    for cervical dysplasia     MOUTH SURGERY      infected tooth, hospitalized for 8 days in 2/2017     SLING TRANSVAGINAL  6/27/2013    Procedure: SLING TRANSVAGINAL;  Cysto with TVT;  Surgeon: Michael Nicole MD;  Location: [facility] OR     TONSILLECTOMY & ADENOIDECTOMY         Social History     Tobacco Use     Smoking status: Never Smoker     Smokeless tobacco: Never Used   Substance Use Topics     Alcohol use: Yes     Comment: occasional, 3 drinks a week       Family History   Problem Relation Age of Onset     Cancer Mother         lung-smoker     Depression Mother      Hypertension Mother      Unknown/Adopted Father      Asthma No family hx of      C.A.D. No family hx of      Diabetes No family hx of      Cerebrovascular Disease No family hx of      Breast Cancer No family hx of      Cancer - colorectal No family hx of      Prostate Cancer No family hx of      Colon Cancer No family hx of        REVIEW OF SYSTEMS:     5 point ROS negative except as noted above in HPI, including Gen., Resp., CV, GI &  system review.      PHYSICAL EXAM:   BP (!) 129/94   Pulse 78   Temp 97.7  F (36.5  C) (Skin)   Resp 10   Ht 1.702 m (5' 7\")   Wt 65.8 kg (145 lb)   SpO2 98%   BMI 22.71 kg/m   Estimated body mass index is 22.71 kg/m  as calculated from the following:    Height as of this encounter: 1.702 m (5' 7\").    Weight as of this encounter: 65.8 kg (145 lb).   GENERAL APPEARANCE: healthy and alert  MENTAL STATUS: alert  HEENT: NC/AT, normal neck mobility   RESP: lungs clear to " auscultation - no rales, rhonchi or wheezes  CV: regular rates and rhythm      IMPRESSION   ASA Class 2 - Mild systemic disease        PLAN:     Plan for colonoscopy. We discussed the risks, benefits and alternatives and the patient wished to proceed.    The above has been forwarded to the consulting provider.      Suzanne Swanson MD  [facility]  Phone: [phone]  Fax: [phone]  February 5, 2021

## 2021-02-06 ENCOUNTER — HEALTH MAINTENANCE LETTER (OUTPATIENT)
Age: 56
End: 2021-02-06

## 2021-09-12 ENCOUNTER — HEALTH MAINTENANCE LETTER (OUTPATIENT)
Age: 56
End: 2021-09-12

## 2021-11-17 ENCOUNTER — TRANSFERRED RECORDS (OUTPATIENT)
Dept: HEALTH INFORMATION MANAGEMENT | Facility: CLINIC | Age: 56
End: 2021-11-17
Payer: COMMERCIAL

## 2022-02-27 ENCOUNTER — HEALTH MAINTENANCE LETTER (OUTPATIENT)
Age: 57
End: 2022-02-27

## 2022-07-20 NOTE — TELEPHONE ENCOUNTER
This writer attempted to contact pt on 07/27/20      Reason for call schedule virtual appt and left message.      If patient calls back:   Schedule virtual appointment within 1 week with primary care, document that pt called and close encounter         Billie Larsen MA     Resident

## 2022-11-19 ENCOUNTER — HEALTH MAINTENANCE LETTER (OUTPATIENT)
Age: 57
End: 2022-11-19

## 2023-03-11 ENCOUNTER — E-VISIT (OUTPATIENT)
Dept: URGENT CARE | Facility: CLINIC | Age: 58
End: 2023-03-11
Payer: COMMERCIAL

## 2023-03-11 DIAGNOSIS — H10.33 ACUTE BACTERIAL CONJUNCTIVITIS OF BOTH EYES: Primary | ICD-10-CM

## 2023-03-11 PROCEDURE — 99421 OL DIG E/M SVC 5-10 MIN: CPT | Performed by: NURSE PRACTITIONER

## 2023-03-11 RX ORDER — POLYMYXIN B SULFATE AND TRIMETHOPRIM 1; 10000 MG/ML; [USP'U]/ML
SOLUTION OPHTHALMIC
Qty: 10 ML | Refills: 0 | Status: SHIPPED | OUTPATIENT
Start: 2023-03-11 | End: 2024-01-11

## 2023-03-11 NOTE — PATIENT INSTRUCTIONS
Thank you for choosing us for your care. I have placed an order for a prescription so that you can start treatment. View your full visit summary for details by clicking on the link below. Your pharmacist will able to address any questions you may have about the medication.     If you re not feeling better within 2-3 days, please schedule an appointment.  You can schedule an appointment right here in [facility], or call [phone]  If the visit is for the same symptoms as your eVisit, we ll refund the cost of your eVisit if seen within seven days.      Bacterial Conjunctivitis    You have an infection in the membranes covering the white part of the eye. This part of the eye is called the conjunctiva. The infection is called conjunctivitis. The most common symptoms of conjunctivitis include a thick, pus-like discharge from the eye, swollen eyelids, redness, eyelids sticking together upon awakening, and a gritty or scratchy feeling in the eye. Your infection was caused by bacteria. It may be treated with medicine. With treatment, the infection takes about 7 to 10 days to resolve.   Home care    Use prescribed antibiotic eye drops or ointment as directed to treat the infection.    Apply a warm compress (towel soaked in warm water) to the affected eye 3 to 4 times a day. Do this just before applying medicine to the eye.    Use a warm, wet cloth to wipe away crusting of the eyelids in the morning. This is caused by mucus drainage during the night. You may also use saline irrigating solution or artificial tears to rinse away mucus in the eye. Do not put a patch over the eye.    Wash your hands before and after touching the infected eye. This is to prevent spreading the infection to the other eye, and to other people. Don't share your towels or washcloths with others.    You may use acetaminophen or ibuprofen to control pain, unless another medicine was prescribed. Talk with your healthcare provider before using these  medicines if you have chronic liver or kidney disease. Also talk with your provider if you have ever had a stomach ulcer or digestive bleeding.    Don't wear contact lenses until your eyes have healed and all symptoms are gone.    Follow-up care  Follow up with your healthcare provider, or as advised.  When to seek medical advice  Call your healthcare provider right away if any of these occur:    Worsening vision    Increasing pain in the eye    Increasing swelling or redness of the eyelid    Redness spreading around the eye  [employer] last reviewed this educational content on 4/1/2020 2000-2021 [employer]. All rights reserved. This information is not intended as a substitute for professional medical care. Always follow your healthcare professional's instructions.

## 2023-04-09 ENCOUNTER — HEALTH MAINTENANCE LETTER (OUTPATIENT)
Age: 58
End: 2023-04-09

## 2023-11-18 ENCOUNTER — HEALTH MAINTENANCE LETTER (OUTPATIENT)
Age: 58
End: 2023-11-18

## 2023-12-27 NOTE — MR AVS SNAPSHOT
After Visit Summary   2/28/2017    Palmira Hunt    MRN: [id number]           Patient Information     Date Of Birth          1965        Visit Information        Provider Department      2/28/2017 8:20 AM Hortensia Ricks PA-C [facility]        Today's Diagnoses     Screening for hyperlipidemia    -  1    Need for hepatitis C screening test        Need for prophylactic vaccination with tetanus-diphtheria (TD)        Screening for diabetes mellitus        Menopausal symptoms        Major depression in complete remission (H)        Need for prophylactic vaccination with combined diphtheria-tetanus-pertussis (DTP) vaccine          Care Instructions    Try filling prescription for effexor 150 mg daily  Contact us if this is not covered and will prescribe citalopram  We will notify you of lab results by [employer]     Preventive Health Recommendations  Female Ages 50 - 64    Yearly exam: See your health care provider every year in order to  o Review health changes.   o Discuss preventive care.    o Review your medicines if your doctor has prescribed any.      Get a Pap test every three years (unless you have an abnormal result and your provider advises testing more often).    If you get Pap tests with HPV test, you only need to test every 5 years, unless you have an abnormal result.     You do not need a Pap test if your uterus was removed (hysterectomy) and you have not had cancer.    You should be tested each year for STDs (sexually transmitted diseases) if you're at risk.     Have a mammogram every 1 to 2 years.    Have a colonoscopy at age 50, or have a yearly FIT test (stool test). These exams screen for colon cancer.      Have a cholesterol test every 5 years, or more often if advised.    Have a diabetes test (fasting glucose) every three years. If you are at risk for diabetes, you should have this test more often.     If you are at risk for osteoporosis (brittle bone    Problem: Heart Failure  Goal: Stable Heart Rate and Rhythm  Outcome: Progressing  Goal: Optimal Functional Ability  Intervention: Optimize Functional Ability  Recent Flowsheet Documentation  Taken 12/26/2023 2056 by Klarissa Caputo, RN  Activity Management: activity adjusted per tolerance  Goal: Effective Oxygenation and Ventilation  Outcome: Progressing  Intervention: Promote Airway Secretion Clearance  Recent Flowsheet Documentation  Taken 12/26/2023 2056 by Klarissa Caputo, RN  Activity Management: activity adjusted per tolerance     Goal Outcome Evaluation:         On oral bumex. Purewick in place but leaks twice since I started my shift. On 2L of oxygen during day time. CPAP at night with 2 L oxygen bled into it. Ace wrapped in place.       While sleeping oxygen saturation down to 87 to 88%. RT notified. Increased oxygen supply to 4L.                       disease), think about having a bone density scan (DEXA).    Shots: Get a flu shot each year. Get a tetanus shot every 10 years.    Nutrition:     Eat at least 5 servings of fruits and vegetables each day.    Eat whole-grain bread, whole-wheat pasta and brown rice instead of white grains and rice.    Talk to your provider about Calcium and Vitamin D.     Lifestyle    Exercise at least 150 minutes a week (30 minutes a day, 5 days a week). This will help you control your weight and prevent disease.    Limit alcohol to one drink per day.    No smoking.     Wear sunscreen to prevent skin cancer.     See your dentist every six months for an exam and cleaning.    See your eye doctor every 1 to 2 years.          Follow-ups after your visit        Who to contact     If you have questions or need follow up information about today's clinic visit or your schedule please contact [facility] directly at [phone].  Normal or non-critical lab and imaging results will be communicated to you by [employer], letter or phone within 4 business days after the clinic has received the results. If you do not hear from us within 7 days, please contact the clinic through [employer] or phone. If you have a critical or abnormal lab result, we will notify you by phone as soon as possible.  Submit refill requests through [employer] or call your pharmacy and they will forward the refill request to us. Please allow 3 business days for your refill to be completed.          Additional Information About Your Visit        [employer] Information     [employer] gives you secure access to your electronic health record. If you see a primary care provider, you can also send messages to your care team and make appointments. If you have questions, please call your primary care clinic.  If you do not have a primary care provider, please call [phone] and they will assist you.        Care EveryWhere ID     This is your Care EveryWhere ID. This could be  "used by other organizations to access your [facility] medical records  [id number]        Your Vitals Were     Pulse Temperature Respirations Height Pulse Oximetry BMI (Body Mass Index)    94 98.6  F (37  C) (Oral) 12 5' 7.5\" (1.715 m) 100% 20.65 kg/m2       Blood Pressure from Last 3 Encounters:   02/28/17 104/70   12/27/16 120/88   07/26/16 126/88    Weight from Last 3 Encounters:   02/28/17 133 lb 12.8 oz (60.7 kg)   12/27/16 138 lb (62.6 kg)   07/26/16 137 lb 3.2 oz (62.2 kg)              We Performed the Following     Glucose     Hepatitis C Screen Reflex to HCV RNA Quant and Genotype     Lipid panel reflex to direct LDL     TDAP (ADACEL AGES 11-64)          Today's Medication Changes          These changes are accurate as of: 2/28/17  8:43 AM.  If you have any questions, ask your nurse or doctor.               Start taking these medicines.        Dose/Directions    venlafaxine 150 MG 24 hr capsule   Commonly known as:  EFFEXOR-XR   Used for:  Major depression in complete remission (H)   Started by:  Hortensia Ricks PA-C        Dose:  150 mg   Take 1 capsule (150 mg) by mouth daily   Quantity:  90 capsule   Refills:  3            Where to get your medicines      These medications were sent to [place] [address] [place] [address] JAMEL N[place]ady  [address] [place] [address] [facility] [address]     Phone:  [phone]     venlafaxine 150 MG 24 hr capsule                Primary Care Provider Office Phone #    [facility] [phone]       No address on file        Thank you!     Thank you for choosing [facility]  for your care. Our goal is always to provide you with excellent care. Hearing back from our patients is one way we can continue to improve our services. Please take a few minutes to complete the written survey that you may receive in the mail after your visit with us. Thank you!             Your Updated Medication List - Protect others around you: Learn how to safely " use, store and throw away your medicines at www.disposemymeds.org.          This list is accurate as of: 2/28/17  8:43 AM.  Always use your most recent med list.                   Brand Name Dispense Instructions for use    oxyCODONE-acetaminophen 5-325 MG per tablet    PERCOCET     Reported on 2/28/2017       venlafaxine 150 MG 24 hr capsule    EFFEXOR-XR    90 capsule    Take 1 capsule (150 mg) by mouth daily

## 2024-01-11 DIAGNOSIS — H10.33 ACUTE BACTERIAL CONJUNCTIVITIS OF BOTH EYES: ICD-10-CM

## 2024-01-11 RX ORDER — POLYMYXIN B SULFATE AND TRIMETHOPRIM 1; 10000 MG/ML; [USP'U]/ML
SOLUTION OPHTHALMIC
Qty: 10 ML | Refills: 0 | Status: SHIPPED | OUTPATIENT
Start: 2024-01-11

## 2024-04-06 ENCOUNTER — HEALTH MAINTENANCE LETTER (OUTPATIENT)
Age: 59
End: 2024-04-06

## 2024-10-08 NOTE — TELEPHONE ENCOUNTER
Patient discharged from Inpatient.     Discharge location: [facility]  Discharge date: 2/12/2020  Diagnosis: Chronic Intractable Headache, Unspecified Headache Type, Occlusion of Left Middle Cerebral Artery    Please follow up as appropriate. If no follow up required, please close encounter.      Billie NAILS, Patient Care     
2 = A lot of assistance

## 2024-12-29 ENCOUNTER — HEALTH MAINTENANCE LETTER (OUTPATIENT)
Age: 59
End: 2024-12-29

## 2025-04-13 ENCOUNTER — HEALTH MAINTENANCE LETTER (OUTPATIENT)
Age: 60
End: 2025-04-13

## 2025-05-04 ENCOUNTER — HEALTH MAINTENANCE LETTER (OUTPATIENT)
Age: 60
End: 2025-05-04

## (undated) RX ORDER — FENTANYL CITRATE 50 UG/ML
INJECTION, SOLUTION INTRAMUSCULAR; INTRAVENOUS
Status: DISPENSED
Start: 2021-02-05

## (undated) RX ORDER — DIPHENHYDRAMINE HYDROCHLORIDE 50 MG/ML
INJECTION INTRAMUSCULAR; INTRAVENOUS
Status: DISPENSED
Start: 2021-02-05